# Patient Record
Sex: MALE | Race: BLACK OR AFRICAN AMERICAN | NOT HISPANIC OR LATINO | URBAN - METROPOLITAN AREA
[De-identification: names, ages, dates, MRNs, and addresses within clinical notes are randomized per-mention and may not be internally consistent; named-entity substitution may affect disease eponyms.]

---

## 2023-10-12 ENCOUNTER — INPATIENT (INPATIENT)
Facility: HOSPITAL | Age: 48
LOS: 4 days | Discharge: HOME | DRG: 322 | End: 2023-10-17
Attending: INTERNAL MEDICINE | Admitting: STUDENT IN AN ORGANIZED HEALTH CARE EDUCATION/TRAINING PROGRAM
Payer: COMMERCIAL

## 2023-10-12 VITALS
OXYGEN SATURATION: 94 % | TEMPERATURE: 98 F | SYSTOLIC BLOOD PRESSURE: 117 MMHG | WEIGHT: 279.99 LBS | DIASTOLIC BLOOD PRESSURE: 88 MMHG | HEART RATE: 102 BPM | HEIGHT: 74 IN | RESPIRATION RATE: 20 BRPM

## 2023-10-12 DIAGNOSIS — I10 ESSENTIAL (PRIMARY) HYPERTENSION: ICD-10-CM

## 2023-10-12 DIAGNOSIS — Z79.84 LONG TERM (CURRENT) USE OF ORAL HYPOGLYCEMIC DRUGS: ICD-10-CM

## 2023-10-12 DIAGNOSIS — I24.9 ACUTE ISCHEMIC HEART DISEASE, UNSPECIFIED: ICD-10-CM

## 2023-10-12 DIAGNOSIS — I25.10 ATHEROSCLEROTIC HEART DISEASE OF NATIVE CORONARY ARTERY WITHOUT ANGINA PECTORIS: ICD-10-CM

## 2023-10-12 DIAGNOSIS — R07.9 CHEST PAIN, UNSPECIFIED: ICD-10-CM

## 2023-10-12 DIAGNOSIS — I25.84 CORONARY ATHEROSCLEROSIS DUE TO CALCIFIED CORONARY LESION: ICD-10-CM

## 2023-10-12 DIAGNOSIS — E78.5 HYPERLIPIDEMIA, UNSPECIFIED: ICD-10-CM

## 2023-10-12 DIAGNOSIS — R55 SYNCOPE AND COLLAPSE: ICD-10-CM

## 2023-10-12 DIAGNOSIS — E11.9 TYPE 2 DIABETES MELLITUS WITHOUT COMPLICATIONS: ICD-10-CM

## 2023-10-12 DIAGNOSIS — E83.42 HYPOMAGNESEMIA: ICD-10-CM

## 2023-10-12 DIAGNOSIS — I25.119 ATHEROSCLEROTIC HEART DISEASE OF NATIVE CORONARY ARTERY WITH UNSPECIFIED ANGINA PECTORIS: ICD-10-CM

## 2023-10-12 DIAGNOSIS — F12.91 CANNABIS USE, UNSPECIFIED, IN REMISSION: ICD-10-CM

## 2023-10-12 LAB
ANION GAP SERPL CALC-SCNC: 13 MMOL/L — SIGNIFICANT CHANGE UP (ref 5–17)
APTT BLD: 35.4 SEC — SIGNIFICANT CHANGE UP (ref 24.5–35.6)
BASOPHILS # BLD AUTO: 0.1 K/UL — SIGNIFICANT CHANGE UP (ref 0–0.2)
BASOPHILS NFR BLD AUTO: 1.8 % — SIGNIFICANT CHANGE UP (ref 0–2)
BUN SERPL-MCNC: 12 MG/DL — SIGNIFICANT CHANGE UP (ref 7–23)
CALCIUM SERPL-MCNC: 10 MG/DL — SIGNIFICANT CHANGE UP (ref 8.4–10.5)
CHLORIDE SERPL-SCNC: 94 MMOL/L — LOW (ref 96–108)
CO2 SERPL-SCNC: 29 MMOL/L — SIGNIFICANT CHANGE UP (ref 22–31)
CREAT SERPL-MCNC: 1.12 MG/DL — SIGNIFICANT CHANGE UP (ref 0.5–1.3)
EGFR: 81 ML/MIN/1.73M2 — SIGNIFICANT CHANGE UP
EOSINOPHIL # BLD AUTO: 0.16 K/UL — SIGNIFICANT CHANGE UP (ref 0–0.5)
EOSINOPHIL NFR BLD AUTO: 2.9 % — SIGNIFICANT CHANGE UP (ref 0–6)
GLUCOSE BLDC GLUCOMTR-MCNC: 134 MG/DL — HIGH (ref 70–99)
GLUCOSE SERPL-MCNC: 141 MG/DL — HIGH (ref 70–99)
HCT VFR BLD CALC: 45 % — SIGNIFICANT CHANGE UP (ref 39–50)
HGB BLD-MCNC: 15.5 G/DL — SIGNIFICANT CHANGE UP (ref 13–17)
IMM GRANULOCYTES NFR BLD AUTO: 0.4 % — SIGNIFICANT CHANGE UP (ref 0–0.9)
INR BLD: 1.28 — HIGH (ref 0.85–1.18)
LYMPHOCYTES # BLD AUTO: 2.26 K/UL — SIGNIFICANT CHANGE UP (ref 1–3.3)
LYMPHOCYTES # BLD AUTO: 40.6 % — SIGNIFICANT CHANGE UP (ref 13–44)
MCHC RBC-ENTMCNC: 29.3 PG — SIGNIFICANT CHANGE UP (ref 27–34)
MCHC RBC-ENTMCNC: 34.4 GM/DL — SIGNIFICANT CHANGE UP (ref 32–36)
MCV RBC AUTO: 85.1 FL — SIGNIFICANT CHANGE UP (ref 80–100)
MONOCYTES # BLD AUTO: 0.41 K/UL — SIGNIFICANT CHANGE UP (ref 0–0.9)
MONOCYTES NFR BLD AUTO: 7.4 % — SIGNIFICANT CHANGE UP (ref 2–14)
NEUTROPHILS # BLD AUTO: 2.62 K/UL — SIGNIFICANT CHANGE UP (ref 1.8–7.4)
NEUTROPHILS NFR BLD AUTO: 46.9 % — SIGNIFICANT CHANGE UP (ref 43–77)
NRBC # BLD: 0 /100 WBCS — SIGNIFICANT CHANGE UP (ref 0–0)
PLATELET # BLD AUTO: 275 K/UL — SIGNIFICANT CHANGE UP (ref 150–400)
POTASSIUM SERPL-MCNC: 3.4 MMOL/L — LOW (ref 3.5–5.3)
POTASSIUM SERPL-SCNC: 3.4 MMOL/L — LOW (ref 3.5–5.3)
PROTHROM AB SERPL-ACNC: 14.5 SEC — HIGH (ref 9.5–13)
RBC # BLD: 5.29 M/UL — SIGNIFICANT CHANGE UP (ref 4.2–5.8)
RBC # FLD: 13.2 % — SIGNIFICANT CHANGE UP (ref 10.3–14.5)
SODIUM SERPL-SCNC: 136 MMOL/L — SIGNIFICANT CHANGE UP (ref 135–145)
TROPONIN T, HIGH SENSITIVITY RESULT: 22 NG/L — SIGNIFICANT CHANGE UP (ref 0–51)
WBC # BLD: 5.57 K/UL — SIGNIFICANT CHANGE UP (ref 3.8–10.5)
WBC # FLD AUTO: 5.57 K/UL — SIGNIFICANT CHANGE UP (ref 3.8–10.5)

## 2023-10-12 PROCEDURE — 71046 X-RAY EXAM CHEST 2 VIEWS: CPT | Mod: 26

## 2023-10-12 PROCEDURE — 99221 1ST HOSP IP/OBS SF/LOW 40: CPT

## 2023-10-12 PROCEDURE — 99285 EMERGENCY DEPT VISIT HI MDM: CPT

## 2023-10-12 RX ORDER — ASPIRIN/CALCIUM CARB/MAGNESIUM 324 MG
325 TABLET ORAL ONCE
Refills: 0 | Status: COMPLETED | OUTPATIENT
Start: 2023-10-12 | End: 2023-10-12

## 2023-10-12 RX ORDER — MAGNESIUM OXIDE 400 MG ORAL TABLET 241.3 MG
800 TABLET ORAL ONCE
Refills: 0 | Status: COMPLETED | OUTPATIENT
Start: 2023-10-12 | End: 2023-10-12

## 2023-10-12 RX ORDER — POTASSIUM CHLORIDE 20 MEQ
40 PACKET (EA) ORAL ONCE
Refills: 0 | Status: COMPLETED | OUTPATIENT
Start: 2023-10-12 | End: 2023-10-12

## 2023-10-12 RX ORDER — ATORVASTATIN CALCIUM 80 MG/1
40 TABLET, FILM COATED ORAL AT BEDTIME
Refills: 0 | Status: DISCONTINUED | OUTPATIENT
Start: 2023-10-12 | End: 2023-10-16

## 2023-10-12 RX ADMIN — MAGNESIUM OXIDE 400 MG ORAL TABLET 800 MILLIGRAM(S): 241.3 TABLET ORAL at 23:50

## 2023-10-12 RX ADMIN — Medication 325 MILLIGRAM(S): at 18:36

## 2023-10-12 RX ADMIN — ATORVASTATIN CALCIUM 40 MILLIGRAM(S): 80 TABLET, FILM COATED ORAL at 23:51

## 2023-10-12 RX ADMIN — Medication 40 MILLIEQUIVALENT(S): at 19:32

## 2023-10-12 NOTE — H&P ADULT - NSICDXFAMILYHX_GEN_ALL_CORE_FT
FAMILY HISTORY:  Mother  Still living? Unknown  FHx: heart disease, Age at diagnosis: Age Unknown

## 2023-10-12 NOTE — H&P ADULT - PROBLEM SELECTOR PLAN 5
Follow-up Hemoglobin A1C   -Moderate Dose sliding scale      FULL CODE  DVT Prophylaxis: Heparin Sub Q  Dispo: Pending clinical progression

## 2023-10-12 NOTE — ED PROVIDER NOTE - CLINICAL SUMMARY MEDICAL DECISION MAKING FREE TEXT BOX
48M PMH HTN, HLD, p/w 1-2w of intermittent chest heaviness - associated w/ lightheaded/palpitations/nausea/diaphoresis/SOB. Symptoms are only w/ exertion, resolve when he rests. Currently asymptomatic. No other systemic symptoms. States he went to outside ER ~1w ago and was dc'd from ED w/ PMD f/u - had followed up w/ PMD and was referred to cards but symptoms worsening so came to ED today.   Mild triage tachycardia self resolved, other vitals wnl. Exam as above.  ddx: Concern for anginal symptoms.   Labs, CXR, asa, likely admission for further w/u.

## 2023-10-12 NOTE — H&P ADULT - PROBLEM SELECTOR PLAN 1
Patient presented to ER w/   - EKG 10/12/23: NSR w/ PACs, non specific ICD no acute ischemic changes   - CXR 10/12/23: no acute pathology   - S/p ASA 325mg POx1   - NPO at MN for ischemic work up CCTA vs cath   - F/u TTE in AM -Patient reports exertional C/P with SOB, lightheadedness, diaphoresis, palpitations, nausea   -Troponin T Hs 25->22 (negative) .  EKG non-ischemic. Follow-up repeat EKG in AM.    -NPO for possible CCTA (Tachycardia may preclude this test) vs NST  -Echocardiogram ordered. Follow-up results.   -Continue telemetry   -s/p  mg PO x 1 in ER. Continue ASA 81 mg Daily

## 2023-10-12 NOTE — H&P ADULT - NSHPLABSRESULTS_GEN_ALL_CORE
15.5   5.57  )-----------( 275      ( 12 Oct 2023 16:52 )             45.0       10-12    136  |  94<L>  |  12  ----------------------------<  141<H>  3.4<L>   |  29  |  1.12    Ca    10.0      12 Oct 2023 16:52  Mg     1.7     10-12        PT/INR - ( 12 Oct 2023 18:38 )   PT: 14.5 sec;   INR: 1.28          PTT - ( 12 Oct 2023 18:38 )  PTT:35.4 sec          Urinalysis Basic - ( 12 Oct 2023 16:52 )    Color: x / Appearance: x / SG: x / pH: x  Gluc: 141 mg/dL / Ketone: x  / Bili: x / Urobili: x   Blood: x / Protein: x / Nitrite: x   Leuk Esterase: x / RBC: x / WBC x   Sq Epi: x / Non Sq Epi: x / Bacteria: x        EKG: 15.5   5.57  )-----------( 275      ( 12 Oct 2023 16:52 )             45.0       10-12    136  |  94<L>  |  12  ----------------------------<  141<H>  3.4<L>   |  29  |  1.12    Ca    10.0      12 Oct 2023 16:52  Mg     1.7     10-12      PT/INR - ( 12 Oct 2023 18:38 )   PT: 14.5 sec;   INR: 1.28     PTT - ( 12 Oct 2023 18:38 )  PTT:35.4 sec  CARDIAC MARKERS ( 12 Oct 2023 21:20 )  x     / x     / 295 U/L / x     / 5.2 ng/mL    EKG: NSR at 89 bpm with PACs, no ST changes and TWI III.

## 2023-10-12 NOTE — H&P ADULT - PROBLEM SELECTOR PLAN 2
SBP   -Home meds: Etiology possibly vasovagal in nature given symptomatology   -Orthostatics. UTox   -Telemetry to r/o arrhythmias   -TFTs   -Consider EP consult for possible event monitor/loop recorder     R/O PE/DVT: Add On D-dimer: 648. Cannot use age adjusted d-dimer as patient <50. Tachycardia and patient with limited mobility given back surgery and stays home often due to fear of syncope outside. No tachypnea,No hypoxia, No LE edema or calf pain. LE Venous Duplex ordered can consider CTA R/O PE if clinically indicated. Etiology possibly vasovagal in nature given symptomatology   -Orthostatics. UTox   -Telemetry to r/o arrhythmias   -TFTs   -Consider EP consult for possible event monitor/loop recorder     R/O PE/DVT: Add On D-dimer: 648. Cannot use age adjusted d-dimer as patient <50. Tachycardia and patient with limited mobility given back surgery and stays home often due to fear of syncope outside. No tachypnea, No hypoxia, No LE edema or calf pain. Troponing negative. LE Venous Duplex ordered can consider CTA R/O PE if clinically indicated.

## 2023-10-12 NOTE — PATIENT PROFILE ADULT - FALL HARM RISK - HARM RISK INTERVENTIONS
Assistance OOB with selected safe patient handling equipment/Communicate Risk of Fall with Harm to all staff/Monitor for mental status changes/Monitor gait and stability/Reinforce activity limits and safety measures with patient and family/Tailored Fall Risk Interventions/Use of alarms - bed, chair and/or voice tab/Visual Cue: Yellow wristband and red socks/Bed in lowest position, wheels locked, appropriate side rails in place/Call bell, personal items and telephone in reach/Instruct patient to call for assistance before getting out of bed or chair/Non-slip footwear when patient is out of bed/[place] to call system/Physically safe environment - no spills, clutter or unnecessary equipment/Purposeful Proactive Rounding/Room/bathroom lighting operational, light cord in reach

## 2023-10-12 NOTE — H&P ADULT - ASSESSMENT
48M current smoker PMH HTN, HLD presented to [facility] ED on 10/12 with complaints of intermittent episodes of chest heaviness a/w lightheadness, palpitations, nausea, diaphoresis, SOB which are present w/ exertion and relieved with rest. Trop 25 -> 22 EKG NSR w/ PACS and non specific ICD no acute ischemic changes, CXR clear. Patient loaded w/ ASA 325mg POx1 in ER and now admitted to cardiac tele for ACS rule out/ischemic work up.      48 M former marijuana user (last used 1 month ago) FAIR HISTORIAN with FHx Heart Disease (Mother) and PMH HTN, HLD, DM Type II, ? Hepatitis A, Chronic Back pain s/p Fall s/p Back Surgery 8/2019, GIB (underwent endoscopy and colonoscopy 2 months ago with no source of bleed seen, polyp removed with recommendation to come back in 1 year)  who presented to [facility] ED 10/12/13 c/o intermittent episodes of chest heaviness a/w lightheadedness, palpitations, nausea, diaphoresis, SOB. Troponin negative and EKG nonischemic admitted for R/O ACS

## 2023-10-12 NOTE — ED ADULT NURSE REASSESSMENT NOTE - NS ED NURSE REASSESS COMMENT FT1
Received report from day shift RN/ Pt resting comfortably in chair. Resp even and unlabored. Pending admission. V/s updated

## 2023-10-12 NOTE — H&P ADULT - PROBLEM SELECTOR PLAN 4
, HDL 31, Total Cholesterol 163, Triglycerides 63.   -Follow-up Fasting Lipid Panel in AM   -Continue Atorvastatin 40 mg PO QHS

## 2023-10-12 NOTE — ED PROVIDER NOTE - OBJECTIVE STATEMENT
48M PMH HTN, HLD, p/w 1-2w of intermittent chest heaviness - associated w/ lightheaded/palpitations/nausea/diaphoresis/SOB. Symptoms are only w/ exertion, resolve when he rests. Currently asymptomatic. No other systemic symptoms. States he went to outside ER ~1w ago and was dc'd from ED w/ PMD f/u - had followed up w/ PMD and was referred to cards but symptoms worsening so came to ED today.   Denies associated vomiting, diarrhea, palpitations, cough, rhinorrhea, black stool, bloody stool, LE pain, LE swelling, focal weakness/numbness, recent travel/immobilization, abd pain, urinary complaints, f/c. No hormone use.  No known prior cardiac w/u.

## 2023-10-12 NOTE — H&P ADULT - HISTORY OF PRESENT ILLNESS
48M current smoker [facility] HTN, HLD presented to [facility] ED on 10/12 with complaints of intermittent episodes of chest heaviness a/w lightheadness, palpitations, nausea, diaphoresis, SOB which are present w/ exertion and relieved with rest. At this time patient denies CP, BONE, SOB, palpitations, cough, abdominal pain, N/V/D, melena, BRBPR, LE swelling, fever, chills, or sick contacts.    Patient states he presented to ER 1 week for similar complaints and was sent home and told to follow up with PMD and cardiologist. Symptoms worsened which prompted patient to return to ER.     ER Course   WBC  5.57 H/H 15.5/45.0 Plt 275   PTT 14.5 INR 1.28  PT35.4   Trop T 25 -> 22   Na 136 K 3.4 Cl 94 BUN/Cr 12/1.12 Glucose 141 Mag 1.7   Chol 163 Tri 79 HDL 31    EKG 10/12/23: 89 BPM PACs non-specific intraventricular conduction delay no acute ischemic changes   CXR 10/12/23: no cardiomegaly, lungs clear     ED Interventions: ASA 325mg POx1 and KCl 40mmEq POx1   Admit to cardiac tele for ACS rule out.    48 M former marijuana user (last used 1 month ago) FAIR HISTORIAN with FHx Heart Disease (Mother) and PMH HTN, HLD, DM Type II, ? Hepatitis A, Chronic Back pain s/p Fall s/p Back Surgery 8/2019, GIB (underwent endoscopy and colonoscopy 2 months ago with no source of bleed seen, polyp removed with recommendation to come back in 1 year) who presents to [facility] ED 10/12/13 c/o intermittent episodes of chest heaviness a/w lightheadedness, palpitations, nausea, diaphoresis, SOB which are present w/ exertion and relieved with rest. Patient reports he has been experiencing these symptoms since Summer 2023 however feels they have gotten worse. He also admits to syncope x 4 episodes at home following these symptoms but denies paresthesias of upper or lower extremities, weakness/paralysis of upper or lower extremities, seizure activity, loss of cordelia or bladder incontinence, H/A prior to or after syncopal episodes. He also denies checking Fingerstick before or after syncopal episodes. Patient states he presented to [facility] ER 1 week for similar complaints and was sent home and told to follow up with PMD and cardiologist. Symptoms worsened which prompted patient to return to ER. He denies LE edema, PND, orthopnea, cough, fever, chills, diarrhea, abdominal pain. VS on arrival to ER /88  RR 20 Temp 97.9 F and O2 sat 94% RA. Troponin T Hs 25->22, .  EKG showed NSR at 89 bpm with PACs, no ST changes and TWI III. CXR (wet read) no effusions or infiltrates. Labs significant for K 3.4, Cl 94, Glucose 141. Treatment in the ER:  mg PO x 1 and Kdur 40 mEq PO x 1. Patient is now admitted to R/O ACS including serial enzymes, telemetry, echocardiogram and probable ischemic evaluation.     Medication list obtained from [place] and confirmed with patient at bedside    48 M former marijuana user (last used 1 month ago) FAIR HISTORIAN with FHx Heart Disease (Mother) and PMH HTN, HLD, DM Type II, ? Hepatitis A, Chronic Back pain s/p Fall s/p Back Surgery 8/2019, GIB (underwent endoscopy and colonoscopy 2 months ago with no source of bleed seen, polyp removed with recommendation to come back in 1 year) who presents to [facility] ED 10/12/13 c/o intermittent episodes of chest heaviness a/w lightheadedness, palpitations, nausea, diaphoresis, SOB which are present w/ exertion and relieved with rest. Patient reports he has been experiencing these symptoms since Summer 2023 however feels they have gotten worse. He also admits to syncope x 4 episodes at home following these symptoms but denies paresthesias of upper or lower extremities, weakness/paralysis of upper or lower extremities, seizure activity, loss of cordelia or bladder incontinence, H/A prior to or after syncopal episodes. He also denies checking Fingerstick before or after syncopal episodes. Patient states he presented to [facility] ER 1 week for similar complaints and was sent home and told to follow up with PMD and cardiologist. Symptoms worsened which prompted patient to return to ER. He denies LE edema, PND, orthopnea, cough, fever, chills, diarrhea, abdominal pain. VS on arrival to ER /88  RR 20 Temp 97.9 F and O2 sat 94% RA. Troponin T Hs 25->22, .  EKG showed NSR at 89 bpm with PACs, no ST changes and TWI III. CXR (wet read) no effusions or infiltrates. Labs significant for K 3.4, Cl 94, Glucose 141. Treatment in the ER:  mg PO x 1 and Kdur 40 mEq PO x 1. Patient is now admitted to R/O ACS including serial enzymes, telemetry, echocardiogram and probable ischemic evaluation.

## 2023-10-12 NOTE — H&P ADULT - NS ATTEND AMEND GEN_ALL_CORE FT
I saw, evaluated, and examined the patient at the bedside. I discussed the patient’s case with the ACP and agree with ACP’s note, ROS findings, physical exam, assessment, and plan as documented in the ACP’s note, with the following addendum.     47 yo man PMHx of cannabis use disorder, HTN, DM2, and HLD who was admitted for cardiac chest pain and dyspnea on exertion x few months.    Assessment  1) Cardiac chest pain - ACS ruled out w/ negative hsTrop   2) Dyspnea on exertion  3) HTN  4) DM2  5) HLD  6) Elevated d-dimer    Plan  1) Will obtain TTE and CCTA  2) Given cannabis use, tachycardia, dyspnea, and elevated d-dimer, will obtain CT angio PE protocol to r/o PE. No clinical suspicion for DVT.  3) ASA 81mg daily and lipitor 40mg qhs  4) Continue mtoprolol and lisinopril at home dose for HTN    I spent 40 minutes in total time. During non face-to-face time, I reviewed relevant portions of the patient’s medical record. During face-to-face time, I took a relevant history and examined the patient. I also explained differential diagnoses, relevant cardiac diagnoses, workup, and management plan. I answered all questions related to the patient's medical conditions.     Álvaro Horton M.D.  CARDIOLOGY ATTENDING

## 2023-10-12 NOTE — ED ADULT NURSE NOTE - CHIEF COMPLAINT QUOTE
c/o palpitations, dizziness, diaphoresis with activity and relieved with rest x 2 months. hx of HTN, DM. denies CP, SOB, blurry vision. ekg in progress.

## 2023-10-12 NOTE — PATIENT PROFILE ADULT - DO YOU FEEL UNSAFE AT HOME, WORK, OR SCHOOL?
Cam Perez is a 45 y.o. male  Hyperlipidemia (F/U on labs)      History of Present Illness  Patient is a pleasant 45-year-old black male who comes in complaining of hyperlipidemia, HDL was 39 LDL was 172, A1c was 6.0, patient states that he has started to watch his diet and try and exercise does have a family history of diabetes.  Patient states does not follow a close diet  The following portions of the patient's history were reviewed and updated as appropriate: allergies, current medications, past social history and problem list    Review of Systems   Constitutional: Negative for appetite change, diaphoresis, fatigue and unexpected weight change.   Eyes: Negative for visual disturbance.   Respiratory: Negative for cough, chest tightness and shortness of breath.    Cardiovascular: Negative for chest pain, palpitations and leg swelling.   Gastrointestinal: Negative for diarrhea, nausea and vomiting.   Endocrine: Negative for polydipsia, polyphagia and polyuria.   Skin: Negative for color change and rash.   Neurological: Negative for dizziness, syncope, weakness, light-headedness, numbness and headaches.       Objective     Vitals:    12/02/22 1058   BP: 134/80   Pulse: 56   Resp: 14   Temp: 97 °F (36.1 °C)   SpO2: 99%       Physical Exam  Vitals and nursing note reviewed.   Constitutional:       General: He is not in acute distress.     Appearance: Normal appearance. He is well-developed. He is not ill-appearing, toxic-appearing or diaphoretic.   Neck:      Vascular: No carotid bruit or JVD.   Cardiovascular:      Rate and Rhythm: Normal rate and regular rhythm.      Pulses: Normal pulses.      Heart sounds: Normal heart sounds. No murmur heard.  Pulmonary:      Effort: Pulmonary effort is normal. No respiratory distress.      Breath sounds: Normal breath sounds.   Abdominal:      Palpations: Abdomen is soft.      Tenderness: There is no abdominal tenderness.   Skin:     General: Skin is warm and  dry.   Neurological:      Mental Status: He is alert.         Assessment & Plan     Diagnoses and all orders for this visit:    1. Hyperlipidemia, unspecified hyperlipidemia type (Primary)  -     Lipid Panel; Future  -     Hepatic Function Panel; Future    2. Screen for colon cancer  -     Cologuard - Stool, Per Rectum; Future  -     atorvastatin (Lipitor) 20 MG tablet; Take 1 tablet by mouth Daily.  Dispense: 90 tablet; Refill: 2    Recheck labs in 6 weeks    I spent 15 minutes in patient care: Reviewing records prior to the visit, examining the patient, entering orders and documentation    Part of this note may be an electronic transcription/translation of spoken language to printed text using the Dragon Dictation System.      no

## 2023-10-12 NOTE — ED ADULT NURSE NOTE - OBJECTIVE STATEMENT
49 y/o M with pmhx HTN and DM presents to the ED c/o intermittent episodes of palpitations, diaphoresis, and dizziness x2 months. States he was last seen for sx Friday and didn't get the answers so he came back today. Denies recent changes to his BP medications and states he did not take his insulin today because he did not eat. Denies having sx present at this time. Denies CP, SOB, edema, N/V/D, and any other complaints at this time. On exam, A&Ox4, NAD, ambulatory on RA. VSS. Orthostatics negative, see flowsheet. Skin dry. No edema. Pt placed on the cardiac monitor. PIV placed. Labs sent.

## 2023-10-12 NOTE — ED PROVIDER NOTE - PROGRESS NOTE DETAILS
[place]: CXR wnl. INR 1.28, K 3.4 (repleted), trop 25, Cl 94, other labs grossly wnl. Remains asymptomatic in ED. Will admit cards for further care. Updated pt.

## 2023-10-13 ENCOUNTER — TRANSCRIPTION ENCOUNTER (OUTPATIENT)
Age: 48
End: 2023-10-13

## 2023-10-13 DIAGNOSIS — R07.9 CHEST PAIN, UNSPECIFIED: ICD-10-CM

## 2023-10-13 DIAGNOSIS — E11.9 TYPE 2 DIABETES MELLITUS WITHOUT COMPLICATIONS: ICD-10-CM

## 2023-10-13 DIAGNOSIS — R55 SYNCOPE AND COLLAPSE: ICD-10-CM

## 2023-10-13 DIAGNOSIS — Z98.890 OTHER SPECIFIED POSTPROCEDURAL STATES: Chronic | ICD-10-CM

## 2023-10-13 LAB
A1C WITH ESTIMATED AVERAGE GLUCOSE RESULT: 6.4 % — HIGH (ref 4–5.6)
ALBUMIN SERPL ELPH-MCNC: 3.8 G/DL — SIGNIFICANT CHANGE UP (ref 3.3–5)
ALP SERPL-CCNC: 90 U/L — SIGNIFICANT CHANGE UP (ref 40–120)
ALT FLD-CCNC: 30 U/L — SIGNIFICANT CHANGE UP (ref 10–45)
ANION GAP SERPL CALC-SCNC: 10 MMOL/L — SIGNIFICANT CHANGE UP (ref 5–17)
APPEARANCE UR: CLEAR — SIGNIFICANT CHANGE UP
AST SERPL-CCNC: 19 U/L — SIGNIFICANT CHANGE UP (ref 10–40)
BASOPHILS # BLD AUTO: 0.1 K/UL — SIGNIFICANT CHANGE UP (ref 0–0.2)
BASOPHILS NFR BLD AUTO: 1.9 % — SIGNIFICANT CHANGE UP (ref 0–2)
BILIRUB SERPL-MCNC: 0.4 MG/DL — SIGNIFICANT CHANGE UP (ref 0.2–1.2)
BILIRUB UR-MCNC: ABNORMAL
BUN SERPL-MCNC: 18 MG/DL — SIGNIFICANT CHANGE UP (ref 7–23)
CALCIUM SERPL-MCNC: 9.6 MG/DL — SIGNIFICANT CHANGE UP (ref 8.4–10.5)
CHLORIDE SERPL-SCNC: 100 MMOL/L — SIGNIFICANT CHANGE UP (ref 96–108)
CHOLEST SERPL-MCNC: 152 MG/DL — SIGNIFICANT CHANGE UP
CK MB CFR SERPL CALC: 5.2 NG/ML — SIGNIFICANT CHANGE UP (ref 0–6.7)
CK SERPL-CCNC: 295 U/L — HIGH (ref 30–200)
CO2 SERPL-SCNC: 30 MMOL/L — SIGNIFICANT CHANGE UP (ref 22–31)
COLOR SPEC: YELLOW — SIGNIFICANT CHANGE UP
CREAT ?TM UR-MCNC: 424 MG/DL — SIGNIFICANT CHANGE UP
CREAT SERPL-MCNC: 1.3 MG/DL — SIGNIFICANT CHANGE UP (ref 0.5–1.3)
D DIMER BLD IA.RAPID-MCNC: 648 NG/ML DDU — HIGH
DIFF PNL FLD: NEGATIVE — SIGNIFICANT CHANGE UP
EGFR: 68 ML/MIN/1.73M2 — SIGNIFICANT CHANGE UP
EOSINOPHIL # BLD AUTO: 0.19 K/UL — SIGNIFICANT CHANGE UP (ref 0–0.5)
EOSINOPHIL NFR BLD AUTO: 3.6 % — SIGNIFICANT CHANGE UP (ref 0–6)
ESTIMATED AVERAGE GLUCOSE: 137 MG/DL — HIGH (ref 68–114)
GLUCOSE BLDC GLUCOMTR-MCNC: 102 MG/DL — HIGH (ref 70–99)
GLUCOSE BLDC GLUCOMTR-MCNC: 111 MG/DL — HIGH (ref 70–99)
GLUCOSE BLDC GLUCOMTR-MCNC: 123 MG/DL — HIGH (ref 70–99)
GLUCOSE BLDC GLUCOMTR-MCNC: 95 MG/DL — SIGNIFICANT CHANGE UP (ref 70–99)
GLUCOSE SERPL-MCNC: 106 MG/DL — HIGH (ref 70–99)
GLUCOSE UR QL: NEGATIVE — SIGNIFICANT CHANGE UP
HCT VFR BLD CALC: 43.1 % — SIGNIFICANT CHANGE UP (ref 39–50)
HDLC SERPL-MCNC: 28 MG/DL — LOW
HGB BLD-MCNC: 14.1 G/DL — SIGNIFICANT CHANGE UP (ref 13–17)
IMM GRANULOCYTES NFR BLD AUTO: 0.2 % — SIGNIFICANT CHANGE UP (ref 0–0.9)
KETONES UR-MCNC: 15 MG/DL
LEUKOCYTE ESTERASE UR-ACNC: NEGATIVE — SIGNIFICANT CHANGE UP
LIPID PNL WITH DIRECT LDL SERPL: 107 MG/DL — HIGH
LYMPHOCYTES # BLD AUTO: 2.52 K/UL — SIGNIFICANT CHANGE UP (ref 1–3.3)
LYMPHOCYTES # BLD AUTO: 48.3 % — HIGH (ref 13–44)
MAGNESIUM SERPL-MCNC: 1.8 MG/DL — SIGNIFICANT CHANGE UP (ref 1.6–2.6)
MCHC RBC-ENTMCNC: 28.5 PG — SIGNIFICANT CHANGE UP (ref 27–34)
MCHC RBC-ENTMCNC: 32.7 GM/DL — SIGNIFICANT CHANGE UP (ref 32–36)
MCV RBC AUTO: 87.1 FL — SIGNIFICANT CHANGE UP (ref 80–100)
MONOCYTES # BLD AUTO: 0.46 K/UL — SIGNIFICANT CHANGE UP (ref 0–0.9)
MONOCYTES NFR BLD AUTO: 8.8 % — SIGNIFICANT CHANGE UP (ref 2–14)
NEUTROPHILS # BLD AUTO: 1.94 K/UL — SIGNIFICANT CHANGE UP (ref 1.8–7.4)
NEUTROPHILS NFR BLD AUTO: 37.2 % — LOW (ref 43–77)
NITRITE UR-MCNC: NEGATIVE — SIGNIFICANT CHANGE UP
NON HDL CHOLESTEROL: 124 MG/DL — SIGNIFICANT CHANGE UP
NRBC # BLD: 0 /100 WBCS — SIGNIFICANT CHANGE UP (ref 0–0)
OSMOLALITY UR: 1048 MOSM/KG — HIGH (ref 300–900)
PH UR: 5.5 — SIGNIFICANT CHANGE UP (ref 5–8)
PLATELET # BLD AUTO: 263 K/UL — SIGNIFICANT CHANGE UP (ref 150–400)
POTASSIUM SERPL-MCNC: 3.6 MMOL/L — SIGNIFICANT CHANGE UP (ref 3.5–5.3)
POTASSIUM SERPL-SCNC: 3.6 MMOL/L — SIGNIFICANT CHANGE UP (ref 3.5–5.3)
POTASSIUM UR-SCNC: 61 MMOL/L — SIGNIFICANT CHANGE UP
PROT ?TM UR-MCNC: 16 MG/DL — HIGH (ref 0–12)
PROT SERPL-MCNC: 7.5 G/DL — SIGNIFICANT CHANGE UP (ref 6–8.3)
PROT UR-MCNC: NEGATIVE MG/DL — SIGNIFICANT CHANGE UP
PROT/CREAT UR-RTO: 0 RATIO — SIGNIFICANT CHANGE UP (ref 0–0.2)
RBC # BLD: 4.95 M/UL — SIGNIFICANT CHANGE UP (ref 4.2–5.8)
RBC # FLD: 13.6 % — SIGNIFICANT CHANGE UP (ref 10.3–14.5)
SODIUM SERPL-SCNC: 140 MMOL/L — SIGNIFICANT CHANGE UP (ref 135–145)
SODIUM UR-SCNC: 162 MMOL/L — SIGNIFICANT CHANGE UP
SP GR SPEC: >=1.03 — SIGNIFICANT CHANGE UP (ref 1–1.03)
T4 FREE SERPL-MCNC: 1.26 NG/DL — SIGNIFICANT CHANGE UP (ref 0.93–1.7)
TRIGL SERPL-MCNC: 86 MG/DL — SIGNIFICANT CHANGE UP
TSH SERPL-MCNC: 2.49 UIU/ML — SIGNIFICANT CHANGE UP (ref 0.27–4.2)
UROBILINOGEN FLD QL: 1 E.U./DL — SIGNIFICANT CHANGE UP
UUN UR-MCNC: 1474 MG/DL — SIGNIFICANT CHANGE UP
WBC # BLD: 5.22 K/UL — SIGNIFICANT CHANGE UP (ref 3.8–10.5)
WBC # FLD AUTO: 5.22 K/UL — SIGNIFICANT CHANGE UP (ref 3.8–10.5)

## 2023-10-13 PROCEDURE — 71275 CT ANGIOGRAPHY CHEST: CPT | Mod: 26

## 2023-10-13 PROCEDURE — 99232 SBSQ HOSP IP/OBS MODERATE 35: CPT

## 2023-10-13 PROCEDURE — 75574 CT ANGIO HRT W/3D IMAGE: CPT | Mod: 26

## 2023-10-13 PROCEDURE — 93306 TTE W/DOPPLER COMPLETE: CPT | Mod: 26

## 2023-10-13 RX ORDER — METOPROLOL TARTRATE 50 MG
25 TABLET ORAL ONCE
Refills: 0 | Status: DISCONTINUED | OUTPATIENT
Start: 2023-10-13 | End: 2023-10-13

## 2023-10-13 RX ORDER — INSULIN LISPRO 100/ML
VIAL (ML) SUBCUTANEOUS
Refills: 0 | Status: DISCONTINUED | OUTPATIENT
Start: 2023-10-13 | End: 2023-10-17

## 2023-10-13 RX ORDER — ASPIRIN/CALCIUM CARB/MAGNESIUM 324 MG
81 TABLET ORAL DAILY
Refills: 0 | Status: DISCONTINUED | OUTPATIENT
Start: 2023-10-13 | End: 2023-10-17

## 2023-10-13 RX ORDER — DEXTROSE 50 % IN WATER 50 %
25 SYRINGE (ML) INTRAVENOUS ONCE
Refills: 0 | Status: DISCONTINUED | OUTPATIENT
Start: 2023-10-13 | End: 2023-10-17

## 2023-10-13 RX ORDER — LISINOPRIL 2.5 MG/1
30 TABLET ORAL DAILY
Refills: 0 | Status: DISCONTINUED | OUTPATIENT
Start: 2023-10-13 | End: 2023-10-17

## 2023-10-13 RX ORDER — ALOGLIPTIN AND METFORMIN HYDROCHLORIDE 12.5; 5 MG/1; MG/1
1 TABLET, FILM COATED ORAL
Refills: 0 | DISCHARGE

## 2023-10-13 RX ORDER — SODIUM CHLORIDE 9 MG/ML
1000 INJECTION, SOLUTION INTRAVENOUS
Refills: 0 | Status: DISCONTINUED | OUTPATIENT
Start: 2023-10-13 | End: 2023-10-17

## 2023-10-13 RX ORDER — METOPROLOL TARTRATE 50 MG
25 TABLET ORAL ONCE
Refills: 0 | Status: COMPLETED | OUTPATIENT
Start: 2023-10-13 | End: 2023-10-13

## 2023-10-13 RX ORDER — DEXTROSE 50 % IN WATER 50 %
12.5 SYRINGE (ML) INTRAVENOUS ONCE
Refills: 0 | Status: DISCONTINUED | OUTPATIENT
Start: 2023-10-13 | End: 2023-10-17

## 2023-10-13 RX ORDER — BACLOFEN 100 %
1 POWDER (GRAM) MISCELLANEOUS
Refills: 0 | DISCHARGE

## 2023-10-13 RX ORDER — POTASSIUM CHLORIDE 20 MEQ
40 PACKET (EA) ORAL ONCE
Refills: 0 | Status: COMPLETED | OUTPATIENT
Start: 2023-10-13 | End: 2023-10-13

## 2023-10-13 RX ORDER — GLUCAGON INJECTION, SOLUTION 0.5 MG/.1ML
1 INJECTION, SOLUTION SUBCUTANEOUS ONCE
Refills: 0 | Status: DISCONTINUED | OUTPATIENT
Start: 2023-10-13 | End: 2023-10-17

## 2023-10-13 RX ORDER — LISINOPRIL 2.5 MG/1
1 TABLET ORAL
Refills: 0 | DISCHARGE

## 2023-10-13 RX ORDER — MAGNESIUM OXIDE 400 MG ORAL TABLET 241.3 MG
800 TABLET ORAL ONCE
Refills: 0 | Status: COMPLETED | OUTPATIENT
Start: 2023-10-13 | End: 2023-10-13

## 2023-10-13 RX ORDER — INFLUENZA VIRUS VACCINE 15; 15; 15; 15 UG/.5ML; UG/.5ML; UG/.5ML; UG/.5ML
0.5 SUSPENSION INTRAMUSCULAR ONCE
Refills: 0 | Status: DISCONTINUED | OUTPATIENT
Start: 2023-10-13 | End: 2023-10-17

## 2023-10-13 RX ORDER — BACLOFEN 100 %
20 POWDER (GRAM) MISCELLANEOUS EVERY 12 HOURS
Refills: 0 | Status: DISCONTINUED | OUTPATIENT
Start: 2023-10-13 | End: 2023-10-17

## 2023-10-13 RX ORDER — DEXTROSE 50 % IN WATER 50 %
15 SYRINGE (ML) INTRAVENOUS ONCE
Refills: 0 | Status: DISCONTINUED | OUTPATIENT
Start: 2023-10-13 | End: 2023-10-17

## 2023-10-13 RX ORDER — HEPARIN SODIUM 5000 [USP'U]/ML
5000 INJECTION INTRAVENOUS; SUBCUTANEOUS EVERY 8 HOURS
Refills: 0 | Status: DISCONTINUED | OUTPATIENT
Start: 2023-10-13 | End: 2023-10-17

## 2023-10-13 RX ADMIN — MAGNESIUM OXIDE 400 MG ORAL TABLET 800 MILLIGRAM(S): 241.3 TABLET ORAL at 17:55

## 2023-10-13 RX ADMIN — HEPARIN SODIUM 5000 UNIT(S): 5000 INJECTION INTRAVENOUS; SUBCUTANEOUS at 22:26

## 2023-10-13 RX ADMIN — LISINOPRIL 30 MILLIGRAM(S): 2.5 TABLET ORAL at 06:24

## 2023-10-13 RX ADMIN — Medication 81 MILLIGRAM(S): at 12:50

## 2023-10-13 RX ADMIN — Medication 25 MILLIGRAM(S): at 12:50

## 2023-10-13 RX ADMIN — HEPARIN SODIUM 5000 UNIT(S): 5000 INJECTION INTRAVENOUS; SUBCUTANEOUS at 06:25

## 2023-10-13 RX ADMIN — Medication 40 MILLIEQUIVALENT(S): at 17:55

## 2023-10-13 RX ADMIN — ATORVASTATIN CALCIUM 40 MILLIGRAM(S): 80 TABLET, FILM COATED ORAL at 22:26

## 2023-10-13 NOTE — DISCHARGE NOTE PROVIDER - CARE PROVIDERS DIRECT ADDRESSES
,DirectAddress_Unknown ,DirectAddress_Unknown,DirectAddress_Unknown ,DirectAddress_Unknown,DirectAddress_Unknown,[email or web address]

## 2023-10-13 NOTE — PROGRESS NOTE ADULT - PROBLEM SELECTOR PLAN 5
Follow-up Hemoglobin A1C   -Moderate Dose sliding scale      FULL CODE  DVT Prophylaxis: Heparin Sub Q  Dispo: Pending clinical progression Hgb A1c 6.4  -Moderate Dose sliding scale      FULL CODE  DVT Prophylaxis: Heparin Sub Q  Dispo: Pending clinical progression

## 2023-10-13 NOTE — PROGRESS NOTE ADULT - SUBJECTIVE AND OBJECTIVE BOX
S: Pt seen and examined bedside.  Patient denies C/P, SOB, N/V, dizziness, palpitations, and diaphoresis.  Pt denies fever/chills, dysuria, abdominal pain, diarrhea, and cough  12 Point ROS otherwise negative except as per HPI/subjective.     O: Vital Signs Last 24 Hrs  T(C): 36.4 (13 Oct 2023 12:49), Max: 37.1 (12 Oct 2023 22:05)  T(F): 97.6 (13 Oct 2023 12:49), Max: 98.7 (12 Oct 2023 22:05)  HR: 71 (13 Oct 2023 12:49) (68 - 102)  BP: 113/72 (13 Oct 2023 12:49) (110/78 - 137/82)  BP(mean): 90 (13 Oct 2023 06:02) (88 - 94)  RR: 18 (13 Oct 2023 12:49) (17 - 20)  SpO2: 97% (13 Oct 2023 12:49) (94% - 98%)    Parameters below as of 13 Oct 2023 12:49  Patient On (Oxygen Delivery Method): room air        PHYSICAL EXAM:  GEN: NAD  HEENT: No JVD  PULM:  CTA B/L  CARD:  RRR, S1 and S2   ABD: +BS, NT, soft/ND	  EXT: No Edema B/L LE  NEURO: A+Ox3, no focal deficit  PSYCH: Mood Appropriate    LABS:                        14.1   5.22  )-----------( 263      ( 13 Oct 2023 06:50 )             43.1     10-13    140  |  100  |  18  ----------------------------<  106<H>  3.6   |  30  |  1.30    Ca    9.6      13 Oct 2023 05:30  Mg     1.8     10-13    TPro  7.5  /  Alb  3.8  /  TBili  0.4  /  DBili  x   /  AST  19  /  ALT  30  /  AlkPhos  90  10-13    PT/INR - ( 12 Oct 2023 18:38 )   PT: 14.5 sec;   INR: 1.28          PTT - ( 12 Oct 2023 18:38 )  PTT:35.4 sec      10-12 @ 07:01  -  10-13 @ 07:00  --------------------------------------------------------  IN: 0 mL / OUT: 325 mL / NET: -325 mL    10-13 @ 07:01  -  10-13 @ 15:01  --------------------------------------------------------  IN: 0 mL / OUT: 200 mL / NET: -200 mL      Daily Height in cm: 187.96 (12 Oct 2023 23:00)    Daily    S: Pt seen and examined bedside. Pt reports feeling a little bit lightheaded on his way to the bathroom earlier, otherwise is feeling well.   Patient denies C/P, SOB, N/V, palpitations, and diaphoresis.  Pt denies fever/chills, dysuria, abdominal pain, diarrhea, and cough  12 Point ROS otherwise negative except as per HPI/subjective.     O: Vital Signs Last 24 Hrs  T(C): 36.4 (13 Oct 2023 12:49), Max: 37.1 (12 Oct 2023 22:05)  T(F): 97.6 (13 Oct 2023 12:49), Max: 98.7 (12 Oct 2023 22:05)  HR: 71 (13 Oct 2023 12:49) (68 - 102)  BP: 113/72 (13 Oct 2023 12:49) (110/78 - 137/82)  BP(mean): 90 (13 Oct 2023 06:02) (88 - 94)  RR: 18 (13 Oct 2023 12:49) (17 - 20)  SpO2: 97% (13 Oct 2023 12:49) (94% - 98%)    Parameters below as of 13 Oct 2023 12:49  Patient On (Oxygen Delivery Method): room air        PHYSICAL EXAM:  GEN: NAD  HEENT: No JVD  PULM:  CTA B/L, no WRR  CARD:  RRR, S1 and S2, no murmurs   ABD: +BS, NT, soft/ND	  EXT: No Edema B/L LE, distal pulses intact   NEURO: A+Ox3, no focal deficit  PSYCH: Mood Appropriate    LABS:                        14.1   5.22  )-----------( 263      ( 13 Oct 2023 06:50 )             43.1     10-13    140  |  100  |  18  ----------------------------<  106<H>  3.6   |  30  |  1.30    Ca    9.6      13 Oct 2023 05:30  Mg     1.8     10-13    TPro  7.5  /  Alb  3.8  /  TBili  0.4  /  DBili  x   /  AST  19  /  ALT  30  /  AlkPhos  90  10-13    PT/INR - ( 12 Oct 2023 18:38 )   PT: 14.5 sec;   INR: 1.28          PTT - ( 12 Oct 2023 18:38 )  PTT:35.4 sec      10-12 @ 07:01  -  10-13 @ 07:00  --------------------------------------------------------  IN: 0 mL / OUT: 325 mL / NET: -325 mL    10-13 @ 07:01  -  10-13 @ 15:01  --------------------------------------------------------  IN: 0 mL / OUT: 200 mL / NET: -200 mL      Daily Height in cm: 187.96 (12 Oct 2023 23:00)    Daily

## 2023-10-13 NOTE — DISCHARGE NOTE PROVIDER - CARE PROVIDER_API CALL
Robert Eaton  [address] [place] [address]  Phone: [phone]  Fax: (   )    -  Scheduled Appointment: 10/20/2023 02:45 PM   Robert Eaton  [address] [place] [address]  Phone: [phone]  Fax: (   )    -  Scheduled Appointment: 10/20/2023 02:45 PM    Antonella Arciniega  Pulmonary Disease  [address] [place] [address]  Phone: [phone]  Fax: [phone]  Follow Up Time: 2 weeks   Antonella Arciniega  Pulmonary Disease  [address], [address] [place] [address]  Phone: [phone]  Fax: [phone]  Follow Up Time: 2 weeks    Robert Eaton  [address] [place] [address]  Phone: [phone]  Fax: (   )    -  Scheduled Appointment: 10/20/2023 02:45 PM    Bo Campbell  Cardiac Electrophysiology  [address], [address] [place] [address]  Phone: [phone]  Fax: [phone]  Follow Up Time:

## 2023-10-13 NOTE — PROGRESS NOTE ADULT - ASSESSMENT
48 M former marijuana user (last used 1 month ago) FAIR HISTORIAN with FHx Heart Disease (Mother) and PMH HTN, HLD, DM Type II, ? Hepatitis A, Chronic Back pain s/p Fall s/p Back Surgery 8/2019, GIB (underwent endoscopy and colonoscopy 2 months ago with no source of bleed seen, polyp removed with recommendation to come back in 1 year)  who presented to [facility] ED 10/12/13 c/o intermittent episodes of chest heaviness a/w lightheadedness, palpitations, nausea, diaphoresis, SOB. Troponin negative and EKG nonischemic admitted for R/O ACS      48 M former marijuana user (last used 1 month ago) with FHx Heart Disease (Mother) and PMH HTN, HLD, DM Type II, ? Hepatitis A, Chronic Back pain s/p Fall s/p Back Surgery 8/2019, GIB (underwent endoscopy and colonoscopy 2 months ago with no source of bleed seen)  who presented to [facility] ED 10/12/13 c/o intermittent episodes of chest heaviness a/w lightheadedness, palpitations, nausea, diaphoresis, SOB. Troponin negative, EKG nonischemic, D-dimer elevated admitted for R/O ACS. Pending CCTA and CTA chest PE/RO.

## 2023-10-13 NOTE — PROGRESS NOTE ADULT - PROBLEM SELECTOR PLAN 3
BP stable. Continue Lisinopril. Hold HCTZ  given Hypokalemia and may be causing /contributing to syncopal episodes. BP stable.   -Continue Lisinopril 30mg daily. Hold HCTZ given Hypokalemia and may be causing /contributing to syncopal episodes.

## 2023-10-13 NOTE — DISCHARGE NOTE PROVIDER - NSDCMRMEDTOKEN_GEN_ALL_CORE_FT
alogliptin-metformin 12.5 mg-1000 mg oral tablet: 1 tab(s) orally 2 times a day  atorvastatin 40 mg oral tablet: 1 tab(s) orally once a day (at bedtime)  baclofen 20 mg oral tablet: 1 tab(s) orally 2 times a day as needed for Pain Pa  hydroCHLOROthiazide 50 mg oral tablet: 1 tab(s) orally once a day  lisinopril 30 mg oral tablet: 1 tab(s) orally once a day  naproxen 500 mg oral tablet: 1 tab(s) orally 2 times a day as needed for Pain   alogliptin-metformin 12.5 mg-1000 mg oral tablet: 1 tab(s) orally 2 times a day  aspirin 81 mg oral delayed release tablet: 1 tab(s) orally once a day  atorvastatin 40 mg oral tablet: 1 tab(s) orally once a day (at bedtime)  baclofen 20 mg oral tablet: 1 tab(s) orally 2 times a day as needed for Pain Pa  clopidogrel 75 mg oral tablet: 1 tab(s) orally once a day  lisinopril 30 mg oral tablet: 1 tab(s) orally once a day   alogliptin-metformin 12.5 mg-1000 mg oral tablet: 1 tab(s) orally 2 times a day  aspirin 81 mg oral delayed release tablet: 1 tab(s) orally once a day  Aspirin Enteric Coated 81 mg oral delayed release tablet: 1 tab(s) orally once a day  atorvastatin 80 mg oral tablet: 1 tab(s) orally once a day (at bedtime)  atorvastatin 80 mg oral tablet: 1 tab(s) orally once a day (at bedtime)  baclofen 20 mg oral tablet: 1 tab(s) orally 2 times a day as needed for Pain Pa  Cardiac Rehab: 2-3x/week for 12 weeks. Dx CAD with recent PCI. Dr. Robert Eaton [phone]  clopidogrel 75 mg oral tablet: 1 tab(s) orally once a day  lisinopril 30 mg oral tablet: 1 tab(s) orally once a day  lisinopril 30 mg oral tablet: 1 tab(s) orally once a day  Plavix 75 mg oral tablet: 1 tab(s) orally once a day

## 2023-10-13 NOTE — DISCHARGE NOTE PROVIDER - PROVIDER TOKENS
FREE:[LAST:[Ali],FIRST:[Robert],PHONE:[phone]],FAX:[(   )    -],ADDRESS:[[address] [place] [address]],SCHEDULEDAPPT:[10/20/2023],SCHEDULEDAPPTTIME:[02:45 PM]] FREE:[LAST:[Ali],FIRST:[Robret],PHONE:[phone]],FAX:[(   )    -],ADDRESS:[[address] [place] [address]],SCHEDULEDAPPT:[10/20/2023],SCHEDULEDAPPTTIME:[02:45 PM]],PROVIDER:[TOKEN:[[id number]],FOLLOWUP:[2 weeks]] PROVIDER:[TOKEN:[[id number]],FOLLOWUP:[2 weeks]],FREE:[LAST:[Ali],FIRST:[Robert],PHONE:[phone]],FAX:[(   )    -],ADDRESS:[[address] [place] [address]],SCHEDULEDAPPT:[10/20/2023],SCHEDULEDAPPTTIME:[02:45 PM]],PROVIDER:[TOKEN:[[id number]]]

## 2023-10-13 NOTE — PROGRESS NOTE ADULT - PROBLEM SELECTOR PLAN 1
-Patient reports exertional C/P with SOB, lightheadedness, diaphoresis, palpitations, nausea   -Troponin T Hs 25->22 (negative) .  EKG non-ischemic. Follow-up repeat EKG in AM.    -NPO for possible CCTA (Tachycardia may preclude this test) vs NST  -Echocardiogram ordered. Follow-up results.   -Continue telemetry   -s/p  mg PO x 1 in ER. Continue ASA 81 mg Daily -Pt reports exertional C/P with SOB, lightheadedness, diaphoresis, palpitations, nausea   -Troponin T Hs 25->22 (negative) . D-dimer 648. EKG non-ischemic.   -ECHO 10/13/23: EF 61%, normal biventricular size and systolic function, No significant valvular disease, No evidence of pHTN, No pericardial effusion.  -CCTA performed 10/13, f/u results   -CTA chest PE protocol performed 10/13, f/u results   -s/p  mg PO x 1 in ER. Continue ASA 81 mg Daily

## 2023-10-13 NOTE — PROGRESS NOTE ADULT - PROBLEM SELECTOR PLAN 4
, HDL 31, Total Cholesterol 163, Triglycerides 63.   -Follow-up Fasting Lipid Panel in AM   -Continue Atorvastatin 40 mg PO QHS , HDL 28, Total Cholesterol 152, Triglycerides 86.   -Continue Atorvastatin 40 mg PO QHS

## 2023-10-13 NOTE — DISCHARGE NOTE PROVIDER - HOSPITAL COURSE
***INCOMPLETE***    48M, former marijuana user, w/ FHx of Heart Disease and PMHx of HTN, HLD, DM-II, GIB (s/p endoscopy/colonoscopy 2 month ago w/o source of bleeding), ? Hepatitis A, and Chronic Back pain 2/2 Back Surgery 8/2019, presented to [facility] ED c/o intermittent episodes of chest heaviness a/w lightheadedness, palpitations, nausea, diaphoresis, SOB which are present w/ exertion and relieved with rest. Patient reports he has been experiencing these symptoms since Summer 2023 however feels they have gotten worse.   In ED, VSS, Labs significant for hsTropT 25 > 22, , K 3.4 and Ddimer 648. EKG: SR w/ PACs and TWI in III. Pt admitted to cardiac tele for r/o ACS.    Pt remained CP free and HD stable. No events on telemetry, EKG remained unchanged and TTE revealed ________. ?? CCTA/NST ?? revealed ____. LE Duplex revealed _______. ?? EP consulted for h/o frequent syncope, ____.     Pt seen and examined at bedside this AM without any complaints or events overnight, VSS, labs and telemetry reviewed and pt stable for discharge as discussed with  ___. Pt has received appropriate discharge instructions, including medication regimen, access site management and follow up with  __ in 1-2 weeks.    Discharge medications: ______________. ***LAST UPDATE 10/15    48M, former marijuana user, w/ FHx of Heart Disease and PMHx of HTN, HLD, DM-II, GIB (s/p endoscopy/colonoscopy 2 month ago w/o source of bleeding), ?Hepatitis A, and Chronic Back pain 2/2 Back Surgery 8/2019, presented to [facility] ED c/o intermittent episodes of chest heaviness a/w lightheadedness, palpitations, nausea, diaphoresis, SOB which are present w/ exertion and relieved with rest. Patient reports he has been experiencing these symptoms since Summer 2023 however feels they have gotten worse.     In ED, VSS, Labs significant for hsTropT 25 > 22, , K 3.4 and Ddimer 648. EKG: SR w/ PACs and TWI in III. CTPE negative; CCTA with calcium score is 197 Agatston units, 98th percentile, mLCx calcific obscures the vessel lumen where stenosis severity is indeterminate; followed by an area of possible severe stenosis, Mild LAD stenosis, Remaining coronary segments appear. Pt was then admitted to [facility] cardiology service for further management and work-up.     Given CCTA unable to r/o significant LCx stenosis, pt underwent LHC on 10/16/23 with: ___     Additionally, given hx of multiple syncope episodes, home HCTZ 50mg QD was held during hospital course an EP was consulted and recommended ___.    Pt seen and examined at bedside this AM without any complaints or events overnight, VSS, labs and telemetry reviewed and pt stable for discharge as discussed with  ___. Pt has received appropriate discharge instructions, including medication regimen, access site management and follow up with  __ in 1-2 weeks.    CV REGIMEN UPON DISCHARGE  DAPT:  Statin: Atorvastatin 40mg QHS      ***LAST UPDATE 10/16    48M, former marijuana user, w/ FHx of Heart Disease and PMHx of HTN, HLD, DM-II, GIB (s/p endoscopy/colonoscopy 2 month ago w/o source of bleeding), ?Hepatitis A, and Chronic Back pain 2/2 Back Surgery 8/2019, presented to [facility] ED c/o intermittent episodes of chest heaviness a/w lightheadedness, palpitations, nausea, diaphoresis, SOB which are present w/ exertion and relieved with rest. Patient reports he has been experiencing these symptoms since Summer 2023 however feels they have gotten worse.     In ED, VSS, Labs significant for hsTropT 25 > 22, , K 3.4 and Ddimer 648. EKG: SR w/ PACs and TWI in III. CTPE negative; CCTA with calcium score is 197 Agatston units, 98th percentile, mLCx calcific obscures the vessel lumen where stenosis severity is indeterminate; followed by an area of possible severe stenosis, Mild LAD stenosis, Remaining coronary segments appear. Pt was then admitted to [facility] cardiology service for further management and work-up.     Given CCTA unable to r/o significant LCx stenosis, pt underwent LHC on 10/16/23 with CHINA x1 to mLCx (78%); rest mild diffuse disease; EDP 5 -- via RRA; no complications [Interventionalist: Dr. CHERYL Bains].       Additionally, given hx of multiple syncope episodes, home HCTZ 50mg QD was held during hospital course an EP was consulted and recommended ___.    Pt seen and examined at bedside this AM without any complaints or events overnight, VSS, labs and telemetry reviewed and pt stable for discharge as discussed with  ___. Pt has received appropriate discharge instructions, including medication regimen, access site management and follow up with  __ in 1-2 weeks.    CV REGIMEN UPON DISCHARGE  DAPT: ASA 81mg QD and Plavix 75mg QD  Statin: Atorvastatin 80mg QHS   Lisinopril 30mg QD       48M, former marijuana user, w/ FHx of Heart Disease and PMHx of HTN, HLD, DM-II, GIB (s/p endoscopy/colonoscopy 2 month ago w/o source of bleeding), ?Hepatitis A, and Chronic Back pain 2/2 Back Surgery 8/2019, presented to [facility] ED c/o intermittent episodes of chest heaviness a/w lightheadedness, palpitations, nausea, diaphoresis, SOB which are present w/ exertion and relieved with rest. Patient reports he has been experiencing these symptoms since Summer 2023 however feels they have gotten worse.     In ED, VSS, Labs significant for hsTropT 25 > 22, , K 3.4 and Ddimer 648. EKG: SR w/ PACs and TWI in III. CTPE negative; CCTA with calcium score is 197 Agatston units, 98th percentile, mLCx calcific obscures the vessel lumen where stenosis severity is indeterminate; followed by an area of possible severe stenosis, Mild LAD stenosis, Remaining coronary segments appear. Pt was then admitted to [facility] cardiology service for further management and work-up.     Given CCTA unable to r/o significant LCx stenosis, pt underwent LHC on 10/16/23 with CHINA x1 to mLCx (78%); rest mild diffuse disease; EDP 5 -- via RRA; no complications [Interventionalist: Dr. CHERYL Bains].     Additionally, given hx of multiple syncope episodes, home HCTZ 50mg QD was held during hospital course an EP was consulted and recommended ___.    Pt seen and examined at bedside this AM without any complaints or events overnight, VSS, labs and telemetry reviewed and pt stable for discharge as discussed with Dr. Al. Pt has received appropriate discharge instructions, including medication regimen, access site management and follow up with cardiologist Dr. Robert Eaton on 10/20/23 at 2:45pm.     CV REGIMEN UPON DISCHARGE  DAPT: ASA 81mg QD and Plavix 75mg QD  Statin: Atorvastatin 80mg QHS   Lisinopril 30mg QD    OTHER MEDICATIONS UPON DISCHARGE  Alogliptin-Metformin 12.5-1000mg BID       48M, former marijuana user, w/ FHx of Heart Disease and PMHx of HTN, HLD, DM-II, GIB (s/p endoscopy/colonoscopy 2 month ago w/o source of bleeding), ?Hepatitis A, and Chronic Back pain 2/2 Back Surgery 8/2019, presented to [facility] ED c/o intermittent episodes of chest heaviness a/w lightheadedness, palpitations, nausea, diaphoresis, SOB which are present w/ exertion and relieved with rest. Patient reports he has been experiencing these symptoms since Summer 2023 however feels they have gotten worse.     In ED, VSS, Labs significant for hsTropT 25 > 22, , K 3.4 and Ddimer 648. EKG: SR w/ PACs and TWI in III. CTPE negative; CCTA with calcium score is 197 Agatston units, 98th percentile, mLCx calcific obscures the vessel lumen where stenosis severity is indeterminate; followed by an area of possible severe stenosis, Mild LAD stenosis, Remaining coronary segments appear. Pt was then admitted to [facility] cardiology service for further management and work-up.     Given CCTA unable to r/o significant LCx stenosis, pt underwent LHC on 10/16/23 with CHINA x1 to mLCx (78%); rest mild diffuse disease; EDP 5 -- via RRA; no complications [Interventionalist: Dr. CHERYL Bains].     Additionally, given hx of multiple syncope episodes, home HCTZ 50mg QD was held during hospital course an EP was consulted and recommended ___. 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 Pulm consulted and recommended given pt has been HDS without hypoxia, no further intervention or AC indication is needed.     Pt seen and examined at bedside this AM without any complaints or events overnight, VSS, labs and telemetry reviewed and pt stable for discharge as discussed with Dr. Al. Pt has received appropriate discharge instructions, including medication regimen, access site management and follow up with cardiologist Dr. Robert Eaton on 10/20/23 at 2:45pm.     CV REGIMEN UPON DISCHARGE  DAPT: ASA 81mg QD and Plavix 75mg QD  Statin: Atorvastatin 80mg QHS   Lisinopril 30mg QD    OTHER MEDICATIONS UPON DISCHARGE  Alogliptin-Metformin 12.5-1000mg BID       48M, former marijuana user, w/ FHx of Heart Disease and PMHx of HTN, HLD, DM-II, GIB (s/p endoscopy/colonoscopy 2 month ago w/o source of bleeding), ?Hepatitis A, and Chronic Back pain 2/2 Back Surgery 8/2019, presented to [facility] ED c/o intermittent episodes of chest heaviness a/w lightheadedness, palpitations, nausea, diaphoresis, SOB which are present w/ exertion and relieved with rest. Patient reports he has been experiencing these symptoms since Summer 2023 however feels they have gotten worse.     In ED, VSS, Labs significant for hsTropT 25 > 22, , K 3.4 and Ddimer 648. EKG: SR w/ PACs and TWI in III. CTPE negative; CCTA with calcium score is 197 Agatston units, 98th percentile, mLCx calcific obscures the vessel lumen where stenosis severity is indeterminate; followed by an area of possible severe stenosis, Mild LAD stenosis, Remaining coronary segments appear. Pt was then admitted to [facility] cardiology service for further management and work-up.     Given CCTA unable to r/o significant LCx stenosis, pt underwent LHC on 10/16/23 with CHINA x1 to mLCx (78%); rest mild diffuse disease; EDP 5 -- via RRA; no complications [Interventionalist: Dr. CHERYL Bains].     Additionally, given hx of multiple syncope episodes, home HCTZ 50mg QD was held during hospital course an EP was consulted and recommended Holter monitor. 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 Lower extremity US negative for DVTs. Pulm consulted and recommended given pt has been HDS without hypoxia, no further intervention or AC indication is needed.     Pt seen and examined at bedside this AM without any complaints or events overnight, VSS, labs and telemetry reviewed and pt stable for discharge as discussed with Dr. Al. Pt has received appropriate discharge instructions, including medication regimen, access site management and follow up with cardiologist Dr. Robert Eaton on 10/20/23 at 2:45pm.     CV REGIMEN UPON DISCHARGE  DAPT: ASA 81mg QD and Plavix 75mg QD  Statin: Atorvastatin 80mg QHS   Lisinopril 30mg QD    OTHER MEDICATIONS UPON DISCHARGE  Alogliptin-Metformin 12.5-1000mg BID

## 2023-10-13 NOTE — DISCHARGE NOTE PROVIDER - NSDCCPCAREPLAN_GEN_ALL_CORE_FT
PRINCIPAL DISCHARGE DIAGNOSIS  Diagnosis: Chest pain  Assessment and Plan of Treatment:       SECONDARY DISCHARGE DIAGNOSES  Diagnosis: HTN (hypertension)  Assessment and Plan of Treatment: Please continue ____ as listed to keep your blood pressure controlled. For blood pressure that is too high or too low please see your doctor or go to the emergency room as necessary.    Diagnosis: HLD (hyperlipidemia)  Assessment and Plan of Treatment: Please continue ____ at bedtime to keep your cholesterol low. High cholesterol contributes to heart disease.    Diagnosis: Diabetes mellitus, type 2  Assessment and Plan of Treatment: Your Hemoglobin A1c is ___ and your goal A1c is less than 7.0%. This number measures your average blood sugar level over the last three months.  Please continue ___ as listed for diabetes. Maintain a low carbohydrate, low sugar diet, exercise, monitor your fingerstick blood sugars regarly and follow up with your Endocrinologist/Primary Care Doctor.     PRINCIPAL DISCHARGE DIAGNOSIS  Diagnosis: CAD (coronary artery disease)  Assessment and Plan of Treatment: You were found to have blockages in the arteries of your heart, also known as Coronary Artery Disease. You underwent a cardiac catheterization on 10/16 and received a stent to the Left circumflex artery.  PLEASE CONTINUE ASPIRIN 81MG DAILY AND PLAVIX 75MG DAILY. DO NOT STOP THESE MEDICATIONS FOR ANY REASON AS THEY ARE KEEPING YOUR STENT OPEN AND PREVENTING A HEART ATTACK.   Aspirin and Plavix can put you at increased risk of bleeding; please avoid NSAIDS (such as Motrin, Advil, Ibuprofen, Naproxen, or Aleve, as these medications may further your risk of bleeding  Avoid strenuous activity or heavy lifting anything more than 5lbs for the next five days. Do not take a bath or swim for the next five days; you may shower. For any bleeding or hematoma formation (hardened blood collection under the skin) at the access site of your right wrist please hold pressure and go to the emergency room.   Please follow up with Dr. Robert Eaton on 10/20/23 at 2:45pm at [address]. For recurrent chest pain, please call your doctor or go to the emergency room.  We have provided you with a prescription for cardiac rehab which is medically supervised exercise program for your heart and has been shown to improve the quantity and quality of life of people with heart disease like yours. You should attend cardiac rehab 3 times per week for 12 weeks. We have provided you with a list of nearby facilities. Please call your insurance carrier to determine which of these facilities are covered under your plan. Please bring this prescription with you to your follow up appointment with your cardiologist who can then further assist you to enroll into a cardiac rehab program.      SECONDARY DISCHARGE DIAGNOSES  Diagnosis: HTN (hypertension)  Assessment and Plan of Treatment: Please continue Lisinopril 30mg daily as listed to keep your blood pressure controlled. For blood pressure that is too high or too low please see your doctor or go to the emergency room as necessary.    Diagnosis: HLD (hyperlipidemia)  Assessment and Plan of Treatment: Please continue Atorvastatin 80mg at bedtime to keep your cholesterol low. High cholesterol contributes to heart disease.    Diagnosis: Diabetes mellitus, type 2  Assessment and Plan of Treatment: Your Hemoglobin A1c is 6.4 and your goal A1c is less than 7.0%. This number measures your average blood sugar level over the last three months.  Please continue Alogliptin-Metformin 12.5-1000ng twice daily as listed for diabetes.   Maintain a low carbohydrate, low sugar diet, exercise, monitor your fingerstick blood sugars regarly and follow up with your Endocrinologist/Primary Care Doctor.

## 2023-10-13 NOTE — PROGRESS NOTE ADULT - PROBLEM SELECTOR PLAN 2
Etiology possibly vasovagal in nature given symptomatology   -Orthostatics. UTox   -Telemetry to r/o arrhythmias   -TFTs   -Consider EP consult for possible event monitor/loop recorder     R/O PE/DVT: Add On D-dimer: 648. Cannot use age adjusted d-dimer as patient <50. Tachycardia and patient with limited mobility given back surgery and stays home often due to fear of syncope outside. No tachypnea, No hypoxia, No LE edema or calf pain. Troponing negative. LE Venous Duplex ordered can consider CTA R/O PE if clinically indicated. Etiology possibly vasovagal in nature given symptomatology   -Orthostatics neg. UTox pending    -No arrhythmias on tele   -TFTs wnl  -Consider EP consult for possible event monitor/loop recorder Etiology possibly vasovagal in nature given symptomatology   -Orthostatics neg. UTox pending    -No arrhythmias on tele   -TFTs wnl  -PAC'c on tele. Consider EP consult for possible event monitor/loop recorder

## 2023-10-14 LAB
ANION GAP SERPL CALC-SCNC: 11 MMOL/L — SIGNIFICANT CHANGE UP (ref 5–17)
BASOPHILS # BLD AUTO: 0.09 K/UL — SIGNIFICANT CHANGE UP (ref 0–0.2)
BASOPHILS NFR BLD AUTO: 1.9 % — SIGNIFICANT CHANGE UP (ref 0–2)
BUN SERPL-MCNC: 18 MG/DL — SIGNIFICANT CHANGE UP (ref 7–23)
CALCIUM SERPL-MCNC: 9.2 MG/DL — SIGNIFICANT CHANGE UP (ref 8.4–10.5)
CHLORIDE SERPL-SCNC: 97 MMOL/L — SIGNIFICANT CHANGE UP (ref 96–108)
CO2 SERPL-SCNC: 27 MMOL/L — SIGNIFICANT CHANGE UP (ref 22–31)
CREAT SERPL-MCNC: 1.08 MG/DL — SIGNIFICANT CHANGE UP (ref 0.5–1.3)
EGFR: 85 ML/MIN/1.73M2 — SIGNIFICANT CHANGE UP
EOSINOPHIL # BLD AUTO: 0.14 K/UL — SIGNIFICANT CHANGE UP (ref 0–0.5)
EOSINOPHIL NFR BLD AUTO: 3 % — SIGNIFICANT CHANGE UP (ref 0–6)
GLUCOSE BLDC GLUCOMTR-MCNC: 105 MG/DL — HIGH (ref 70–99)
GLUCOSE BLDC GLUCOMTR-MCNC: 115 MG/DL — HIGH (ref 70–99)
GLUCOSE BLDC GLUCOMTR-MCNC: 170 MG/DL — HIGH (ref 70–99)
GLUCOSE BLDC GLUCOMTR-MCNC: 95 MG/DL — SIGNIFICANT CHANGE UP (ref 70–99)
GLUCOSE SERPL-MCNC: 78 MG/DL — SIGNIFICANT CHANGE UP (ref 70–99)
HCT VFR BLD CALC: 41.7 % — SIGNIFICANT CHANGE UP (ref 39–50)
HGB BLD-MCNC: 13.8 G/DL — SIGNIFICANT CHANGE UP (ref 13–17)
IMM GRANULOCYTES NFR BLD AUTO: 0.2 % — SIGNIFICANT CHANGE UP (ref 0–0.9)
LYMPHOCYTES # BLD AUTO: 2.45 K/UL — SIGNIFICANT CHANGE UP (ref 1–3.3)
LYMPHOCYTES # BLD AUTO: 52.2 % — HIGH (ref 13–44)
MAGNESIUM SERPL-MCNC: 1.8 MG/DL — SIGNIFICANT CHANGE UP (ref 1.6–2.6)
MCHC RBC-ENTMCNC: 28.6 PG — SIGNIFICANT CHANGE UP (ref 27–34)
MCHC RBC-ENTMCNC: 33.1 GM/DL — SIGNIFICANT CHANGE UP (ref 32–36)
MCV RBC AUTO: 86.3 FL — SIGNIFICANT CHANGE UP (ref 80–100)
MONOCYTES # BLD AUTO: 0.4 K/UL — SIGNIFICANT CHANGE UP (ref 0–0.9)
MONOCYTES NFR BLD AUTO: 8.5 % — SIGNIFICANT CHANGE UP (ref 2–14)
NEUTROPHILS # BLD AUTO: 1.6 K/UL — LOW (ref 1.8–7.4)
NEUTROPHILS NFR BLD AUTO: 34.2 % — LOW (ref 43–77)
NRBC # BLD: 0 /100 WBCS — SIGNIFICANT CHANGE UP (ref 0–0)
PLATELET # BLD AUTO: 247 K/UL — SIGNIFICANT CHANGE UP (ref 150–400)
POTASSIUM SERPL-MCNC: 3.6 MMOL/L — SIGNIFICANT CHANGE UP (ref 3.5–5.3)
POTASSIUM SERPL-SCNC: 3.6 MMOL/L — SIGNIFICANT CHANGE UP (ref 3.5–5.3)
RBC # BLD: 4.83 M/UL — SIGNIFICANT CHANGE UP (ref 4.2–5.8)
RBC # FLD: 13.4 % — SIGNIFICANT CHANGE UP (ref 10.3–14.5)
SODIUM SERPL-SCNC: 135 MMOL/L — SIGNIFICANT CHANGE UP (ref 135–145)
WBC # BLD: 4.69 K/UL — SIGNIFICANT CHANGE UP (ref 3.8–10.5)
WBC # FLD AUTO: 4.69 K/UL — SIGNIFICANT CHANGE UP (ref 3.8–10.5)

## 2023-10-14 PROCEDURE — 99233 SBSQ HOSP IP/OBS HIGH 50: CPT

## 2023-10-14 RX ORDER — ONDANSETRON 8 MG/1
4 TABLET, FILM COATED ORAL ONCE
Refills: 0 | Status: COMPLETED | OUTPATIENT
Start: 2023-10-14 | End: 2023-10-14

## 2023-10-14 RX ORDER — POTASSIUM CHLORIDE 20 MEQ
40 PACKET (EA) ORAL ONCE
Refills: 0 | Status: COMPLETED | OUTPATIENT
Start: 2023-10-14 | End: 2023-10-14

## 2023-10-14 RX ORDER — MAGNESIUM OXIDE 400 MG ORAL TABLET 241.3 MG
800 TABLET ORAL ONCE
Refills: 0 | Status: COMPLETED | OUTPATIENT
Start: 2023-10-14 | End: 2023-10-14

## 2023-10-14 RX ORDER — SENNA PLUS 8.6 MG/1
1 TABLET ORAL DAILY
Refills: 0 | Status: DISCONTINUED | OUTPATIENT
Start: 2023-10-14 | End: 2023-10-17

## 2023-10-14 RX ADMIN — LISINOPRIL 30 MILLIGRAM(S): 2.5 TABLET ORAL at 06:22

## 2023-10-14 RX ADMIN — Medication 81 MILLIGRAM(S): at 10:15

## 2023-10-14 RX ADMIN — Medication 40 MILLIEQUIVALENT(S): at 10:10

## 2023-10-14 RX ADMIN — MAGNESIUM OXIDE 400 MG ORAL TABLET 800 MILLIGRAM(S): 241.3 TABLET ORAL at 10:10

## 2023-10-14 RX ADMIN — ONDANSETRON 4 MILLIGRAM(S): 8 TABLET, FILM COATED ORAL at 22:24

## 2023-10-14 RX ADMIN — ONDANSETRON 4 MILLIGRAM(S): 8 TABLET, FILM COATED ORAL at 19:07

## 2023-10-14 RX ADMIN — Medication 2: at 12:38

## 2023-10-14 RX ADMIN — HEPARIN SODIUM 5000 UNIT(S): 5000 INJECTION INTRAVENOUS; SUBCUTANEOUS at 14:12

## 2023-10-14 RX ADMIN — SENNA PLUS 1 TABLET(S): 8.6 TABLET ORAL at 10:15

## 2023-10-14 RX ADMIN — HEPARIN SODIUM 5000 UNIT(S): 5000 INJECTION INTRAVENOUS; SUBCUTANEOUS at 06:22

## 2023-10-14 NOTE — PROGRESS NOTE ADULT - PROBLEM SELECTOR PLAN 3
BP stable.   -Continue Lisinopril 30mg daily. Hold HCTZ given Hypokalemia and may be causing /contributing to syncopal episodes.

## 2023-10-14 NOTE — PROGRESS NOTE ADULT - PROBLEM SELECTOR PLAN 1
-Pt reports exertional C/P with SOB, lightheadedness, diaphoresis, palpitations, nausea   -Troponin T Hs 25->22 (negative) . D-dimer 648. EKG non-ischemic.   -ECHO 10/13/23: EF 61%, normal biventricular size and systolic function, No significant valvular disease, No evidence of pHTN, No pericardial effusion.  -CCTA performed 10/13, f/u results   -CTA chest PE protocol performed 10/13, f/u results   -s/p  mg PO x 1 in ER. Continue ASA 81 mg Daily -Pt reports exertional C/P with SOB, lightheadedness, diaphoresis, palpitations, nausea   -Troponin T Hs 25->22 (negative) . D-dimer 648. EKG non-ischemic.   -ECHO 10/13: EF 61%, normal biventricular size and systolic function, No significant valvular disease, No evidence of pHTN, No pericardial effusion.  -CTA chest PE r/o 10/13: neg for PE   -CCTA 10/13: calcium score is 197 Agatston units, 98th percentile, mLCx calcific obscures the vessel lumen where stenosis severity is indeterminate; followed by an area of possible severe stenosis, Mild LAD stenosis, Remaining coronary segments appear   -Plan for cardiac cath MOn 10/16, consented. Will need Plavix load, IVF and to be added to the schedule w/ Dr. Bains   -Continue ASA 81 mg Daily.

## 2023-10-14 NOTE — PROGRESS NOTE ADULT - PROBLEM SELECTOR PLAN 5
Hgb A1c 6.4  -Moderate Dose sliding scale      FULL CODE  DVT Prophylaxis: Heparin Sub Q  Dispo: Pending clinical progression Hgb A1c 6.4  -Holding PO meds. Moderate Dose sliding scale      FULL CODE  DVT Prophylaxis: Heparin Sub Q  Dispo: Pending clinical progression

## 2023-10-14 NOTE — PROGRESS NOTE ADULT - SUBJECTIVE AND OBJECTIVE BOX
S: Pt seen and examined bedside.  Patient denies C/P, SOB, N/V, dizziness, palpitations, and diaphoresis.  Pt denies fever/chills, dysuria, abdominal pain, diarrhea, and cough  12 Point ROS otherwise negative except as per HPI/subjective.     O: Vital Signs Last 24 Hrs  T(C): 36.4 (14 Oct 2023 06:21), Max: 36.8 (13 Oct 2023 20:52)  T(F): 97.6 (14 Oct 2023 06:21), Max: 98.2 (13 Oct 2023 20:52)  HR: 72 (14 Oct 2023 06:21) (70 - 75)  BP: 110/76 (14 Oct 2023 06:21) (105/66 - 113/72)  BP(mean): --  RR: 18 (14 Oct 2023 06:21) (18 - 18)  SpO2: 95% (14 Oct 2023 06:21) (95% - 97%)    Parameters below as of 14 Oct 2023 06:21  Patient On (Oxygen Delivery Method): room air        PHYSICAL EXAM:  GEN: NAD  HEENT: No JVD  PULM:  CTA B/L  CARD:  RRR, S1 and S2   ABD: +BS, NT, soft/ND	  EXT: No Edema B/L LE  NEURO: A+Ox3, no focal deficit  PSYCH: Mood Appropriate    LABS:                        13.8   4.69  )-----------( 247      ( 14 Oct 2023 05:30 )             41.7     10-14    135  |  97  |  18  ----------------------------<  78  3.6   |  27  |  1.08    Ca    9.2      14 Oct 2023 05:30  Mg     1.8     10-14    TPro  7.5  /  Alb  3.8  /  TBili  0.4  /  DBili  x   /  AST  19  /  ALT  30  /  AlkPhos  90  10-13    PT/INR - ( 12 Oct 2023 18:38 )   PT: 14.5 sec;   INR: 1.28          PTT - ( 12 Oct 2023 18:38 )  PTT:35.4 sec      10-13 @ 07:01  -  10-14 @ 07:00  --------------------------------------------------------  IN: 240 mL / OUT: 750 mL / NET: -510 mL      Daily     Daily    S: Pt seen and examined bedside. Pt reports he is feeling well, states he is happy to be in the hospital getting testing done.   Tele: ST low 100's, w/ PAC's   Patient denies C/P, SOB, N/V, dizziness, palpitations, and diaphoresis.  Pt denies fever/chills, dysuria, abdominal pain, diarrhea, and cough  12 Point ROS otherwise negative except as per HPI/subjective.     O: Vital Signs Last 24 Hrs  T(C): 36.4 (14 Oct 2023 06:21), Max: 36.8 (13 Oct 2023 20:52)  T(F): 97.6 (14 Oct 2023 06:21), Max: 98.2 (13 Oct 2023 20:52)  HR: 72 (14 Oct 2023 06:21) (70 - 75)  BP: 110/76 (14 Oct 2023 06:21) (105/66 - 113/72)  BP(mean): --  RR: 18 (14 Oct 2023 06:21) (18 - 18)  SpO2: 95% (14 Oct 2023 06:21) (95% - 97%)    Parameters below as of 14 Oct 2023 06:21  Patient On (Oxygen Delivery Method): room air        PHYSICAL EXAM:  GEN: NAD  HEENT: No JVD  PULM:  CTA B/L, no WRR  CARD:  RRR, S1 and S2, no murmurs  ABD: +BS, NT, soft/ND	  EXT: No Edema B/L LE, distal pulses intact   NEURO: A+Ox3, no focal deficit  PSYCH: Mood Appropriate    LABS:                        13.8   4.69  )-----------( 247      ( 14 Oct 2023 05:30 )             41.7     10-14    135  |  97  |  18  ----------------------------<  78  3.6   |  27  |  1.08    Ca    9.2      14 Oct 2023 05:30  Mg     1.8     10-14    TPro  7.5  /  Alb  3.8  /  TBili  0.4  /  DBili  x   /  AST  19  /  ALT  30  /  AlkPhos  90  10-13    PT/INR - ( 12 Oct 2023 18:38 )   PT: 14.5 sec;   INR: 1.28          PTT - ( 12 Oct 2023 18:38 )  PTT:35.4 sec      10-13 @ 07:01  -  10-14 @ 07:00  --------------------------------------------------------  IN: 240 mL / OUT: 750 mL / NET: -510 mL      Daily     Daily

## 2023-10-14 NOTE — PROGRESS NOTE ADULT - ASSESSMENT
48 M former marijuana user (last used 1 month ago) with FHx Heart Disease (Mother) and PMH HTN, HLD, DM Type II, ? Hepatitis A, Chronic Back pain s/p Fall s/p Back Surgery 8/2019, GIB (underwent endoscopy and colonoscopy 2 months ago with no source of bleed seen)  who presented to [facility] ED 10/12/13 c/o intermittent episodes of chest heaviness a/w lightheadedness, palpitations, nausea, diaphoresis, SOB. Troponin negative, EKG nonischemic, D-dimer elevated admitted for R/O ACS. Pending CCTA and CTA chest PE/RO.      48 M former marijuana user (last used 1 month ago) with FHx Heart Disease (Mother) and PMH HTN, HLD, DM Type II, ? Hepatitis A, Chronic Back pain s/p Fall s/p Back Surgery 8/2019, GIB (underwent endoscopy and colonoscopy 2 months ago with no source of bleed seen)  who presented to [facility] ED 10/12/13 c/o intermittent episodes of chest heaviness a/w lightheadedness, palpitations, nausea, diaphoresis, SOB. Trop neg, EKG nonischemic. CTA chest neg for PE. CCTA 10/13 unable to r/o sig LCx stenosis, ECHO nl. Plan for cardiac cath on Mon 10/16.      48 M former marijuana user (last used 1 month ago) with FHx Heart Disease (Mother) and PMH HTN, HLD, DM Type II, ? Hepatitis A, Chronic Back pain s/p Fall s/p Back Surgery 8/2019, GIB (underwent endoscopy and colonoscopy 2 months ago with no source of bleed seen)  who presented to [facility] ED 10/12/13 c/o intermittent episodes of chest heaviness a/w lightheadedness, palpitations, nausea, diaphoresis, SOB as well as multiple syncopal episodes over the past several months. Trop neg, EKG nonischemic. CTA chest neg for PE. CCTA 10/13 unable to r/o sig LCx stenosis, ECHO nl. Plan for cardiac cath on Mon 10/16.

## 2023-10-14 NOTE — PROGRESS NOTE ADULT - PROBLEM SELECTOR PLAN 2
Etiology possibly vasovagal in nature given symptomatology   -Orthostatics neg. UTox pending    -No arrhythmias on tele   -TFTs wnl  -PAC'c on tele. Consider EP consult for possible event monitor/loop recorder Etiology possibly vasovagal in nature given symptomatology   -Orthostatics neg. UTox pending    -ST low 100's w/ PAC's on tele   -TFTs wnl  -Consider EP consult for possible event monitor/loop recorder Etiology possibly vasovagal in nature given symptomatology   -Orthostatics neg. UTox pending    -ST low 100's w/ PAC's on tele   -TFTs wnl  -Nl ECHO as above   -Consider EP consult for possible event monitor/loop recorder

## 2023-10-15 LAB
ANION GAP SERPL CALC-SCNC: 9 MMOL/L — SIGNIFICANT CHANGE UP (ref 5–17)
BASOPHILS # BLD AUTO: 0.04 K/UL — SIGNIFICANT CHANGE UP (ref 0–0.2)
BASOPHILS NFR BLD AUTO: 0.8 % — SIGNIFICANT CHANGE UP (ref 0–2)
BUN SERPL-MCNC: 17 MG/DL — SIGNIFICANT CHANGE UP (ref 7–23)
BURR CELLS BLD QL SMEAR: PRESENT — SIGNIFICANT CHANGE UP
CALCIUM SERPL-MCNC: 9 MG/DL — SIGNIFICANT CHANGE UP (ref 8.4–10.5)
CHLORIDE SERPL-SCNC: 97 MMOL/L — SIGNIFICANT CHANGE UP (ref 96–108)
CO2 SERPL-SCNC: 28 MMOL/L — SIGNIFICANT CHANGE UP (ref 22–31)
CREAT SERPL-MCNC: 1.18 MG/DL — SIGNIFICANT CHANGE UP (ref 0.5–1.3)
EGFR: 76 ML/MIN/1.73M2 — SIGNIFICANT CHANGE UP
EOSINOPHIL # BLD AUTO: 0 K/UL — SIGNIFICANT CHANGE UP (ref 0–0.5)
EOSINOPHIL NFR BLD AUTO: 0 % — SIGNIFICANT CHANGE UP (ref 0–6)
GLUCOSE BLDC GLUCOMTR-MCNC: 134 MG/DL — HIGH (ref 70–99)
GLUCOSE BLDC GLUCOMTR-MCNC: 136 MG/DL — HIGH (ref 70–99)
GLUCOSE BLDC GLUCOMTR-MCNC: 139 MG/DL — HIGH (ref 70–99)
GLUCOSE BLDC GLUCOMTR-MCNC: 142 MG/DL — HIGH (ref 70–99)
GLUCOSE SERPL-MCNC: 132 MG/DL — HIGH (ref 70–99)
HCT VFR BLD CALC: 41.7 % — SIGNIFICANT CHANGE UP (ref 39–50)
HGB BLD-MCNC: 14.1 G/DL — SIGNIFICANT CHANGE UP (ref 13–17)
LYMPHOCYTES # BLD AUTO: 0.54 K/UL — LOW (ref 1–3.3)
LYMPHOCYTES # BLD AUTO: 12.2 % — LOW (ref 13–44)
MAGNESIUM SERPL-MCNC: 1.5 MG/DL — LOW (ref 1.6–2.6)
MANUAL SMEAR VERIFICATION: SIGNIFICANT CHANGE UP
MCHC RBC-ENTMCNC: 29.3 PG — SIGNIFICANT CHANGE UP (ref 27–34)
MCHC RBC-ENTMCNC: 33.8 GM/DL — SIGNIFICANT CHANGE UP (ref 32–36)
MCV RBC AUTO: 86.5 FL — SIGNIFICANT CHANGE UP (ref 80–100)
MONOCYTES # BLD AUTO: 0.04 K/UL — SIGNIFICANT CHANGE UP (ref 0–0.9)
MONOCYTES NFR BLD AUTO: 0.9 % — LOW (ref 2–14)
NEUTROPHILS # BLD AUTO: 3.8 K/UL — SIGNIFICANT CHANGE UP (ref 1.8–7.4)
NEUTROPHILS NFR BLD AUTO: 86.1 % — HIGH (ref 43–77)
OVALOCYTES BLD QL SMEAR: SLIGHT — SIGNIFICANT CHANGE UP
PLAT MORPH BLD: NORMAL — SIGNIFICANT CHANGE UP
PLATELET # BLD AUTO: 227 K/UL — SIGNIFICANT CHANGE UP (ref 150–400)
POIKILOCYTOSIS BLD QL AUTO: SLIGHT — SIGNIFICANT CHANGE UP
POLYCHROMASIA BLD QL SMEAR: SLIGHT — SIGNIFICANT CHANGE UP
POTASSIUM SERPL-MCNC: 4 MMOL/L — SIGNIFICANT CHANGE UP (ref 3.5–5.3)
POTASSIUM SERPL-SCNC: 4 MMOL/L — SIGNIFICANT CHANGE UP (ref 3.5–5.3)
RBC # BLD: 4.82 M/UL — SIGNIFICANT CHANGE UP (ref 4.2–5.8)
RBC # FLD: 13.4 % — SIGNIFICANT CHANGE UP (ref 10.3–14.5)
RBC BLD AUTO: ABNORMAL
SODIUM SERPL-SCNC: 134 MMOL/L — LOW (ref 135–145)
WBC # BLD: 4.41 K/UL — SIGNIFICANT CHANGE UP (ref 3.8–10.5)
WBC # FLD AUTO: 4.41 K/UL — SIGNIFICANT CHANGE UP (ref 3.8–10.5)

## 2023-10-15 PROCEDURE — 99233 SBSQ HOSP IP/OBS HIGH 50: CPT

## 2023-10-15 RX ORDER — MAGNESIUM OXIDE 400 MG ORAL TABLET 241.3 MG
800 TABLET ORAL ONCE
Refills: 0 | Status: COMPLETED | OUTPATIENT
Start: 2023-10-15 | End: 2023-10-15

## 2023-10-15 RX ORDER — ONDANSETRON 8 MG/1
4 TABLET, FILM COATED ORAL ONCE
Refills: 0 | Status: COMPLETED | OUTPATIENT
Start: 2023-10-15 | End: 2023-10-15

## 2023-10-15 RX ORDER — SODIUM CHLORIDE 9 MG/ML
1000 INJECTION INTRAMUSCULAR; INTRAVENOUS; SUBCUTANEOUS
Refills: 0 | Status: DISCONTINUED | OUTPATIENT
Start: 2023-10-15 | End: 2023-10-16

## 2023-10-15 RX ADMIN — MAGNESIUM OXIDE 400 MG ORAL TABLET 800 MILLIGRAM(S): 241.3 TABLET ORAL at 11:48

## 2023-10-15 RX ADMIN — Medication 81 MILLIGRAM(S): at 11:49

## 2023-10-15 RX ADMIN — HEPARIN SODIUM 5000 UNIT(S): 5000 INJECTION INTRAVENOUS; SUBCUTANEOUS at 06:12

## 2023-10-15 RX ADMIN — ONDANSETRON 4 MILLIGRAM(S): 8 TABLET, FILM COATED ORAL at 09:19

## 2023-10-15 RX ADMIN — ATORVASTATIN CALCIUM 40 MILLIGRAM(S): 80 TABLET, FILM COATED ORAL at 22:59

## 2023-10-15 RX ADMIN — HEPARIN SODIUM 5000 UNIT(S): 5000 INJECTION INTRAVENOUS; SUBCUTANEOUS at 13:18

## 2023-10-15 RX ADMIN — LISINOPRIL 30 MILLIGRAM(S): 2.5 TABLET ORAL at 07:15

## 2023-10-15 NOTE — PROGRESS NOTE ADULT - PROBLEM SELECTOR PLAN 1
- Continue ASA 81mg  -Troponin T Hs 25->22 (negative) . D-dimer 648. EKG non-ischemic.   - TTE (10/13/23): EF 61%, normal biventricular size and systolic function, No significant valvular disease, No evidence of pHTN, No pericardial effusion.  - CTA PE (10/13/23): neg for PE   - CCTA (10/13/23): Ca score 197 Agatston units, 98th percentile. mLCx calcific obscures the vessel lumen where stenosis severity is indeterminate; followed by an area of possible severe stenosis, Mild LAD stenosis, Remaining coronary segments appear   - Plan for LHC on 10/16/23 -- add on to schedule with Dr. Nara meraz Administer Plavix 600mg load 10/15    o Pre-cath fluids    o Consent in chart

## 2023-10-15 NOTE — PROGRESS NOTE ADULT - ASSESSMENT
48M former marijuana user (last used 1 month ago) with FHx Heart Disease (Mother) and hx of HTN, HLD, DM2, ? Hepatitis A, Chronic Back pain s/p fall s/p back surgery (8/2019), and GIB (negative EGD/C-scope, 2mos ago), presenting with chest heaviness a/w lightheadedness, palpitations, nausea, diaphoresis, SOB as well as multiple syncopal episodes over the past several months. Trop neg, EKG nonischemic; CTPE negative. CCTA (10/13) unable to r/o sig LCx stenosis, ECHO nl. Plan for cardiac cath on Mon 10/16.

## 2023-10-15 NOTE — PROGRESS NOTE ADULT - PROBLEM SELECTOR PLAN 2
- Continue Lisinopril 30mg QD  - Holding home HCTZ 50mg QD given hypokalemia and may be causing /contributing to syncopal episodes

## 2023-10-15 NOTE — PROGRESS NOTE ADULT - SUBJECTIVE AND OBJECTIVE BOX
CARDIOLOGY PA PROGRESS NOTE    Interval Events:  - Admitted yesterday for CP with negative troponin and EKG non-ischemic   - Plan for [facility] 10/16     Overnight Events: GABBY    Subjective:  - Pt seen and examined this AM sitting comfortably in bed. Feels well; no acute complaints of CP, palpitations, SOB, abdominal discomfort, N/V/D    ROS: Negative except per HPI and Subjective     Tele: NSR with frequent PVCs     MEDICATIONS:  lisinopril 30 milliGRAM(s) Oral daily  baclofen 20 milliGRAM(s) Oral every 12 hours PRN  senna 1 Tablet(s) Oral daily  atorvastatin 40 milliGRAM(s) Oral at bedtime  dextrose 50% Injectable 25 Gram(s) IV Push once  dextrose 50% Injectable 12.5 Gram(s) IV Push once  dextrose 50% Injectable 25 Gram(s) IV Push once  dextrose Oral Gel 15 Gram(s) Oral once PRN  glucagon  Injectable 1 milliGRAM(s) IntraMuscular once  insulin lispro (ADMELOG) corrective regimen sliding scale   SubCutaneous Before meals and at bedtime  aspirin enteric coated 81 milliGRAM(s) Oral daily  dextrose 5%. 1000 milliLiter(s) IV Continuous <Continuous>  dextrose 5%. 1000 milliLiter(s) IV Continuous <Continuous>  heparin   Injectable 5000 Unit(s) SubCutaneous every 8 hours  influenza   Vaccine 0.5 milliLiter(s) IntraMuscular once  magnesium oxide 800 milliGRAM(s) Oral once    	  VITAL SIGNS:  T(C): 36.8 (10-15-23 @ 08:34), Max: 37.1 (10-14-23 @ 14:09)  HR: 101 (10-15-23 @ 08:34) (88 - 110)  BP: 119/80 (10-15-23 @ 08:34) (111/77 - 134/66)  RR: 17 (10-15-23 @ 08:34) (16 - 22)  SpO2: 95% (10-15-23 @ 08:34) (95% - 99%)  Wt(kg): --    I&O's Summary    14 Oct 2023 07:01  -  15 Oct 2023 07:00  --------------------------------------------------------  IN: 420 mL / OUT: 500 mL / NET: -80 mL                            PHYSICAL EXAM:  HENT: NCAT, EOMI, PEERLA  CV: RRR, S1/S2. No JVD. No murmurs, rubs or gallops   PULM: CTA B/L. No wheezing, rales, or rhonchi   ABD: Soft, NT/ND, +BS throughout   EXT: Warm, no LE edema  NEURO: AOx3; no focal neuro deficits    LABS:	 	                        14.1   4.41  )-----------( 227      ( 15 Oct 2023 05:30 )             41.7     10-15    134<L>  |  97  |  17  ----------------------------<  132<H>  4.0   |  28  |  1.18    Ca    9.0      15 Oct 2023 05:30  Mg     1.5     10-15

## 2023-10-15 NOTE — PROGRESS NOTE ADULT - PROBLEM SELECTOR PLAN 5
- Hgb A1c 6.4. Monitor FS while on ISS  - Holding home Alogliptin-Metformin 12.5-1000mg BID while in-patient     - Fluids: Plan for pre-cath fluids   - Replete Lytes PRN to K>4, Mg>2  - Diet: DASH + NPO after MN   - DVT ppx: SQH   - Dispo: Medically active. Pending [facility] on 10/16

## 2023-10-15 NOTE — PROGRESS NOTE ADULT - PROBLEM SELECTOR PLAN 4
- Etiology possibly vasovagal in nature given symptomatology   - Orthostatics neg; TFTs WNL; UTox pending    - Consider EP consult for possible event monitor/loop recorder

## 2023-10-16 LAB
ALBUMIN SERPL ELPH-MCNC: 3.3 G/DL — SIGNIFICANT CHANGE UP (ref 3.3–5)
ALP SERPL-CCNC: 92 U/L — SIGNIFICANT CHANGE UP (ref 40–120)
ALT FLD-CCNC: 86 U/L — HIGH (ref 10–45)
ANION GAP SERPL CALC-SCNC: 7 MMOL/L — SIGNIFICANT CHANGE UP (ref 5–17)
APTT BLD: 34.1 SEC — SIGNIFICANT CHANGE UP (ref 24.5–35.6)
AST SERPL-CCNC: 41 U/L — HIGH (ref 10–40)
BILIRUB SERPL-MCNC: 0.3 MG/DL — SIGNIFICANT CHANGE UP (ref 0.2–1.2)
BLD GP AB SCN SERPL QL: NEGATIVE — SIGNIFICANT CHANGE UP
BUN SERPL-MCNC: 11 MG/DL — SIGNIFICANT CHANGE UP (ref 7–23)
CALCIUM SERPL-MCNC: 8.6 MG/DL — SIGNIFICANT CHANGE UP (ref 8.4–10.5)
CHLORIDE SERPL-SCNC: 100 MMOL/L — SIGNIFICANT CHANGE UP (ref 96–108)
CO2 SERPL-SCNC: 27 MMOL/L — SIGNIFICANT CHANGE UP (ref 22–31)
CREAT SERPL-MCNC: 1.06 MG/DL — SIGNIFICANT CHANGE UP (ref 0.5–1.3)
EGFR: 87 ML/MIN/1.73M2 — SIGNIFICANT CHANGE UP
GLUCOSE BLDC GLUCOMTR-MCNC: 105 MG/DL — HIGH (ref 70–99)
GLUCOSE BLDC GLUCOMTR-MCNC: 105 MG/DL — HIGH (ref 70–99)
GLUCOSE BLDC GLUCOMTR-MCNC: 106 MG/DL — HIGH (ref 70–99)
GLUCOSE BLDC GLUCOMTR-MCNC: 113 MG/DL — HIGH (ref 70–99)
GLUCOSE BLDC GLUCOMTR-MCNC: 125 MG/DL — HIGH (ref 70–99)
GLUCOSE BLDC GLUCOMTR-MCNC: 138 MG/DL — HIGH (ref 70–99)
GLUCOSE BLDC GLUCOMTR-MCNC: 138 MG/DL — HIGH (ref 70–99)
GLUCOSE SERPL-MCNC: 110 MG/DL — HIGH (ref 70–99)
HCT VFR BLD CALC: 41.4 % — SIGNIFICANT CHANGE UP (ref 39–50)
HGB BLD-MCNC: 13.5 G/DL — SIGNIFICANT CHANGE UP (ref 13–17)
INR BLD: 1.21 — HIGH (ref 0.85–1.18)
MAGNESIUM SERPL-MCNC: 1.7 MG/DL — SIGNIFICANT CHANGE UP (ref 1.6–2.6)
MCHC RBC-ENTMCNC: 29 PG — SIGNIFICANT CHANGE UP (ref 27–34)
MCHC RBC-ENTMCNC: 32.6 GM/DL — SIGNIFICANT CHANGE UP (ref 32–36)
MCV RBC AUTO: 88.8 FL — SIGNIFICANT CHANGE UP (ref 80–100)
NRBC # BLD: 0 /100 WBCS — SIGNIFICANT CHANGE UP (ref 0–0)
PLATELET # BLD AUTO: 204 K/UL — SIGNIFICANT CHANGE UP (ref 150–400)
POTASSIUM SERPL-MCNC: 4 MMOL/L — SIGNIFICANT CHANGE UP (ref 3.5–5.3)
POTASSIUM SERPL-SCNC: 4 MMOL/L — SIGNIFICANT CHANGE UP (ref 3.5–5.3)
PROT SERPL-MCNC: 7.1 G/DL — SIGNIFICANT CHANGE UP (ref 6–8.3)
PROTHROM AB SERPL-ACNC: 13.7 SEC — HIGH (ref 9.5–13)
RBC # BLD: 4.66 M/UL — SIGNIFICANT CHANGE UP (ref 4.2–5.8)
RBC # FLD: 13.6 % — SIGNIFICANT CHANGE UP (ref 10.3–14.5)
RH IG SCN BLD-IMP: POSITIVE — SIGNIFICANT CHANGE UP
SODIUM SERPL-SCNC: 134 MMOL/L — LOW (ref 135–145)
WBC # BLD: 2.63 K/UL — LOW (ref 3.8–10.5)
WBC # FLD AUTO: 2.63 K/UL — LOW (ref 3.8–10.5)

## 2023-10-16 PROCEDURE — 92928 PRQ TCAT PLMT NTRAC ST 1 LES: CPT | Mod: LC

## 2023-10-16 PROCEDURE — 93458 L HRT ARTERY/VENTRICLE ANGIO: CPT | Mod: 26,59

## 2023-10-16 PROCEDURE — 99152 MOD SED SAME PHYS/QHP 5/>YRS: CPT

## 2023-10-16 PROCEDURE — 99233 SBSQ HOSP IP/OBS HIGH 50: CPT

## 2023-10-16 RX ORDER — SODIUM CHLORIDE 9 MG/ML
1000 INJECTION INTRAMUSCULAR; INTRAVENOUS; SUBCUTANEOUS
Refills: 0 | Status: DISCONTINUED | OUTPATIENT
Start: 2023-10-16 | End: 2023-10-17

## 2023-10-16 RX ORDER — ACETAMINOPHEN 500 MG
650 TABLET ORAL ONCE
Refills: 0 | Status: COMPLETED | OUTPATIENT
Start: 2023-10-16 | End: 2023-10-16

## 2023-10-16 RX ORDER — CLOPIDOGREL BISULFATE 75 MG/1
75 TABLET, FILM COATED ORAL DAILY
Refills: 0 | Status: DISCONTINUED | OUTPATIENT
Start: 2023-10-17 | End: 2023-10-17

## 2023-10-16 RX ORDER — MAGNESIUM SULFATE 500 MG/ML
2 VIAL (ML) INJECTION ONCE
Refills: 0 | Status: COMPLETED | OUTPATIENT
Start: 2023-10-16 | End: 2023-10-16

## 2023-10-16 RX ORDER — ATORVASTATIN CALCIUM 80 MG/1
80 TABLET, FILM COATED ORAL AT BEDTIME
Refills: 0 | Status: DISCONTINUED | OUTPATIENT
Start: 2023-10-16 | End: 2023-10-17

## 2023-10-16 RX ORDER — CLOPIDOGREL BISULFATE 75 MG/1
600 TABLET, FILM COATED ORAL ONCE
Refills: 0 | Status: COMPLETED | OUTPATIENT
Start: 2023-10-16 | End: 2023-10-16

## 2023-10-16 RX ADMIN — ATORVASTATIN CALCIUM 80 MILLIGRAM(S): 80 TABLET, FILM COATED ORAL at 21:09

## 2023-10-16 RX ADMIN — Medication 25 GRAM(S): at 09:48

## 2023-10-16 RX ADMIN — Medication 81 MILLIGRAM(S): at 11:47

## 2023-10-16 RX ADMIN — SODIUM CHLORIDE 250 MILLILITER(S): 9 INJECTION INTRAMUSCULAR; INTRAVENOUS; SUBCUTANEOUS at 15:25

## 2023-10-16 RX ADMIN — Medication 650 MILLIGRAM(S): at 21:11

## 2023-10-16 RX ADMIN — HEPARIN SODIUM 5000 UNIT(S): 5000 INJECTION INTRAVENOUS; SUBCUTANEOUS at 21:09

## 2023-10-16 RX ADMIN — LISINOPRIL 30 MILLIGRAM(S): 2.5 TABLET ORAL at 07:43

## 2023-10-16 RX ADMIN — Medication 650 MILLIGRAM(S): at 19:49

## 2023-10-16 RX ADMIN — CLOPIDOGREL BISULFATE 600 MILLIGRAM(S): 75 TABLET, FILM COATED ORAL at 09:48

## 2023-10-16 NOTE — PROGRESS NOTE ADULT - PROBLEM SELECTOR PLAN 3
- , HDL 28, Total Cholesterol 152, TG 86.   - Consider increasing Atorvastatin 40 mg QHS to 80mg QHS

## 2023-10-16 NOTE — PROGRESS NOTE ADULT - SUBJECTIVE AND OBJECTIVE BOX
CARDIOLOGY PA PROGRESS NOTE    Interval Events:  - Plan for [facility] on 10/16     Overnight Events: GABBY    Subjective:  - Pt seen and examined this AM sitting comfortably in bed. Feels well; no acute complaints of CP, palpitations, SOB, abdominal discomfort, N/V/D    ROS: Negative except per HPI and Subjective     Tele: NSR with frequent [place]     MEDICATIONS:  lisinopril 30 milliGRAM(s) Oral daily  baclofen 20 milliGRAM(s) Oral every 12 hours PRN  senna 1 Tablet(s) Oral daily  atorvastatin 80 milliGRAM(s) Oral at bedtime  dextrose 50% Injectable 12.5 Gram(s) IV Push once  dextrose 50% Injectable 25 Gram(s) IV Push once  dextrose 50% Injectable 25 Gram(s) IV Push once  dextrose Oral Gel 15 Gram(s) Oral once PRN  glucagon  Injectable 1 milliGRAM(s) IntraMuscular once  insulin lispro (ADMELOG) corrective regimen sliding scale   SubCutaneous Before meals and at bedtime  aspirin enteric coated 81 milliGRAM(s) Oral daily  dextrose 5%. 1000 milliLiter(s) IV Continuous <Continuous>  dextrose 5%. 1000 milliLiter(s) IV Continuous <Continuous>  heparin   Injectable 5000 Unit(s) SubCutaneous every 8 hours  influenza   Vaccine 0.5 milliLiter(s) IntraMuscular once  sodium chloride 0.9%. 1000 milliLiter(s) IV Continuous <Continuous>    	  VITAL SIGNS:  T(C): 36 (10-16-23 @ 11:46), Max: 36.7 (10-15-23 @ 17:06)  HR: 73 (10-16-23 @ 11:46) (73 - 84)  BP: 121/77 (10-16-23 @ 11:46) (112/66 - 132/87)  RR: 16 (10-16-23 @ 11:46) (16 - 20)  SpO2: 98% (10-16-23 @ 11:46) (95% - 98%)  Wt(kg): --    I&O's Summary    15 Oct 2023 07:01  -  16 Oct 2023 07:00  --------------------------------------------------------  IN: 600 mL / OUT: 650 mL / NET: -50 mL    16 Oct 2023 07:01  -  16 Oct 2023 15:16  --------------------------------------------------------  IN: 0 mL / OUT: 475 mL / NET: -475 mL                                       PHYSICAL EXAM:  HENT: NCAT, EOMI, PEERLA  CV: RRR, S1/S2. No murmurs, rubs or gallops   PULM: CTA B/L. No wheezing, rales, or rhonchi   ABD: Soft, NT/ND, +BS throughout   EXT: Warm, no LE edema  NEURO: AOx3; no focal neuro deficits    LABS:	 	               13.5   2.63  )-----------( 204      ( 16 Oct 2023 05:30 )             41.4     10-16    134<L>  |  100  |  11  ----------------------------<  110<H>  4.0   |  27  |  1.06    Ca    8.6      16 Oct 2023 05:30  Mg     1.7     10-16    TPro  7.1  /  Alb  3.3  /  TBili  0.3  /  DBili  x   /  AST  41<H>  /  ALT  86<H>  /  AlkPhos  92  10-16      PT/INR - ( 16 Oct 2023 05:30 )   PT: 13.7 sec;   INR: 1.21          PTT - ( 16 Oct 2023 05:30 )  PTT:34.1 sec

## 2023-10-16 NOTE — PROGRESS NOTE ADULT - PROBLEM SELECTOR PLAN 1
- Continue ASA 81mg  -Troponin T Hs 25->22 (negative) . D-dimer 648. EKG non-ischemic.   - TTE (10/13/23): EF 61%, normal biventricular size and systolic function, No significant valvular disease, No evidence of pHTN, No pericardial effusion.  - CTA PE (10/13/23): neg for PE   - CCTA (10/13/23): Ca score 197 Agatston units, 98th percentile. mLCx calcific obscures the vessel lumen where stenosis severity is indeterminate; followed by an area of possible severe stenosis, Mild LAD stenosis, Remaining coronary segments appear   - Plan for LHC on 10/16/23 -- add on to schedule with Dr. Bains     o Administer Plavix 600mg load 10/16    o Pre-cath fluids administered overnight     o Consent in chart

## 2023-10-16 NOTE — PROGRESS NOTE ADULT - PROBLEM SELECTOR PLAN 5
- Hgb A1c 6.4. Monitor FS while on ISS  - Holding home Alogliptin-Metformin 12.5-1000mg BID while in-patient     - Fluids: Plan for pre-cath fluids   - Replete Lytes PRN to K>4, Mg>2  - Diet: DASH + NPO after MN   - DVT ppx: SQH   - Dispo: Medically active. Pending [facility] on 10/16

## 2023-10-17 ENCOUNTER — TRANSCRIPTION ENCOUNTER (OUTPATIENT)
Age: 48
End: 2023-10-17

## 2023-10-17 VITALS
RESPIRATION RATE: 16 BRPM | DIASTOLIC BLOOD PRESSURE: 75 MMHG | SYSTOLIC BLOOD PRESSURE: 122 MMHG | TEMPERATURE: 97 F | OXYGEN SATURATION: 100 % | HEART RATE: 74 BPM

## 2023-10-17 LAB
ANION GAP SERPL CALC-SCNC: 6 MMOL/L — SIGNIFICANT CHANGE UP (ref 5–17)
ANION GAP SERPL CALC-SCNC: 6 MMOL/L — SIGNIFICANT CHANGE UP (ref 5–17)
BUN SERPL-MCNC: 10 MG/DL — SIGNIFICANT CHANGE UP (ref 7–23)
BUN SERPL-MCNC: 10 MG/DL — SIGNIFICANT CHANGE UP (ref 7–23)
CALCIUM SERPL-MCNC: 8.5 MG/DL — SIGNIFICANT CHANGE UP (ref 8.4–10.5)
CALCIUM SERPL-MCNC: 8.5 MG/DL — SIGNIFICANT CHANGE UP (ref 8.4–10.5)
CHLORIDE SERPL-SCNC: 106 MMOL/L — SIGNIFICANT CHANGE UP (ref 96–108)
CHLORIDE SERPL-SCNC: 106 MMOL/L — SIGNIFICANT CHANGE UP (ref 96–108)
CO2 SERPL-SCNC: 25 MMOL/L — SIGNIFICANT CHANGE UP (ref 22–31)
CO2 SERPL-SCNC: 25 MMOL/L — SIGNIFICANT CHANGE UP (ref 22–31)
CREAT SERPL-MCNC: 1.01 MG/DL — SIGNIFICANT CHANGE UP (ref 0.5–1.3)
CREAT SERPL-MCNC: 1.01 MG/DL — SIGNIFICANT CHANGE UP (ref 0.5–1.3)
D DIMER BLD IA.RAPID-MCNC: 213 NG/ML DDU — SIGNIFICANT CHANGE UP
D DIMER BLD IA.RAPID-MCNC: 213 NG/ML DDU — SIGNIFICANT CHANGE UP
EGFR: 92 ML/MIN/1.73M2 — SIGNIFICANT CHANGE UP
EGFR: 92 ML/MIN/1.73M2 — SIGNIFICANT CHANGE UP
GLUCOSE BLDC GLUCOMTR-MCNC: 108 MG/DL — HIGH (ref 70–99)
GLUCOSE BLDC GLUCOMTR-MCNC: 108 MG/DL — HIGH (ref 70–99)
GLUCOSE BLDC GLUCOMTR-MCNC: 112 MG/DL — HIGH (ref 70–99)
GLUCOSE BLDC GLUCOMTR-MCNC: 112 MG/DL — HIGH (ref 70–99)
GLUCOSE BLDC GLUCOMTR-MCNC: 137 MG/DL — HIGH (ref 70–99)
GLUCOSE BLDC GLUCOMTR-MCNC: 137 MG/DL — HIGH (ref 70–99)
GLUCOSE SERPL-MCNC: 85 MG/DL — SIGNIFICANT CHANGE UP (ref 70–99)
GLUCOSE SERPL-MCNC: 85 MG/DL — SIGNIFICANT CHANGE UP (ref 70–99)
HCT VFR BLD CALC: 36.4 % — LOW (ref 39–50)
HCT VFR BLD CALC: 36.4 % — LOW (ref 39–50)
HGB BLD-MCNC: 12 G/DL — LOW (ref 13–17)
HGB BLD-MCNC: 12 G/DL — LOW (ref 13–17)
MAGNESIUM SERPL-MCNC: 1.9 MG/DL — SIGNIFICANT CHANGE UP (ref 1.6–2.6)
MAGNESIUM SERPL-MCNC: 1.9 MG/DL — SIGNIFICANT CHANGE UP (ref 1.6–2.6)
MCHC RBC-ENTMCNC: 29.1 PG — SIGNIFICANT CHANGE UP (ref 27–34)
MCHC RBC-ENTMCNC: 29.1 PG — SIGNIFICANT CHANGE UP (ref 27–34)
MCHC RBC-ENTMCNC: 33 GM/DL — SIGNIFICANT CHANGE UP (ref 32–36)
MCHC RBC-ENTMCNC: 33 GM/DL — SIGNIFICANT CHANGE UP (ref 32–36)
MCV RBC AUTO: 88.1 FL — SIGNIFICANT CHANGE UP (ref 80–100)
MCV RBC AUTO: 88.1 FL — SIGNIFICANT CHANGE UP (ref 80–100)
NRBC # BLD: 0 /100 WBCS — SIGNIFICANT CHANGE UP (ref 0–0)
NRBC # BLD: 0 /100 WBCS — SIGNIFICANT CHANGE UP (ref 0–0)
PLATELET # BLD AUTO: 188 K/UL — SIGNIFICANT CHANGE UP (ref 150–400)
PLATELET # BLD AUTO: 188 K/UL — SIGNIFICANT CHANGE UP (ref 150–400)
POTASSIUM SERPL-MCNC: 4.1 MMOL/L — SIGNIFICANT CHANGE UP (ref 3.5–5.3)
POTASSIUM SERPL-MCNC: 4.1 MMOL/L — SIGNIFICANT CHANGE UP (ref 3.5–5.3)
POTASSIUM SERPL-SCNC: 4.1 MMOL/L — SIGNIFICANT CHANGE UP (ref 3.5–5.3)
POTASSIUM SERPL-SCNC: 4.1 MMOL/L — SIGNIFICANT CHANGE UP (ref 3.5–5.3)
RBC # BLD: 4.13 M/UL — LOW (ref 4.2–5.8)
RBC # BLD: 4.13 M/UL — LOW (ref 4.2–5.8)
RBC # FLD: 13.7 % — SIGNIFICANT CHANGE UP (ref 10.3–14.5)
RBC # FLD: 13.7 % — SIGNIFICANT CHANGE UP (ref 10.3–14.5)
SODIUM SERPL-SCNC: 137 MMOL/L — SIGNIFICANT CHANGE UP (ref 135–145)
SODIUM SERPL-SCNC: 137 MMOL/L — SIGNIFICANT CHANGE UP (ref 135–145)
WBC # BLD: 3.55 K/UL — LOW (ref 3.8–10.5)
WBC # BLD: 3.55 K/UL — LOW (ref 3.8–10.5)
WBC # FLD AUTO: 3.55 K/UL — LOW (ref 3.8–10.5)
WBC # FLD AUTO: 3.55 K/UL — LOW (ref 3.8–10.5)

## 2023-10-17 PROCEDURE — 83735 ASSAY OF MAGNESIUM: CPT

## 2023-10-17 PROCEDURE — 36415 COLL VENOUS BLD VENIPUNCTURE: CPT

## 2023-10-17 PROCEDURE — 85025 COMPLETE CBC W/AUTO DIFF WBC: CPT

## 2023-10-17 PROCEDURE — 86901 BLOOD TYPING SEROLOGIC RH(D): CPT

## 2023-10-17 PROCEDURE — 85730 THROMBOPLASTIN TIME PARTIAL: CPT

## 2023-10-17 PROCEDURE — 83935 ASSAY OF URINE OSMOLALITY: CPT

## 2023-10-17 PROCEDURE — 83036 HEMOGLOBIN GLYCOSYLATED A1C: CPT

## 2023-10-17 PROCEDURE — 84300 ASSAY OF URINE SODIUM: CPT

## 2023-10-17 PROCEDURE — 85027 COMPLETE CBC AUTOMATED: CPT

## 2023-10-17 PROCEDURE — 86900 BLOOD TYPING SEROLOGIC ABO: CPT

## 2023-10-17 PROCEDURE — 84484 ASSAY OF TROPONIN QUANT: CPT

## 2023-10-17 PROCEDURE — C1894: CPT

## 2023-10-17 PROCEDURE — 93306 TTE W/DOPPLER COMPLETE: CPT

## 2023-10-17 PROCEDURE — 71275 CT ANGIOGRAPHY CHEST: CPT

## 2023-10-17 PROCEDURE — 71046 X-RAY EXAM CHEST 2 VIEWS: CPT

## 2023-10-17 PROCEDURE — 75574 CT ANGIO HRT W/3D IMAGE: CPT

## 2023-10-17 PROCEDURE — C1874: CPT

## 2023-10-17 PROCEDURE — C1887: CPT

## 2023-10-17 PROCEDURE — 82570 ASSAY OF URINE CREATININE: CPT

## 2023-10-17 PROCEDURE — 85379 FIBRIN DEGRADATION QUANT: CPT

## 2023-10-17 PROCEDURE — 93005 ELECTROCARDIOGRAM TRACING: CPT

## 2023-10-17 PROCEDURE — 82962 GLUCOSE BLOOD TEST: CPT

## 2023-10-17 PROCEDURE — 93970 EXTREMITY STUDY: CPT

## 2023-10-17 PROCEDURE — 84540 ASSAY OF URINE/UREA-N: CPT

## 2023-10-17 PROCEDURE — 82553 CREATINE MB FRACTION: CPT

## 2023-10-17 PROCEDURE — 99285 EMERGENCY DEPT VISIT HI MDM: CPT | Mod: 25

## 2023-10-17 PROCEDURE — 81003 URINALYSIS AUTO W/O SCOPE: CPT

## 2023-10-17 PROCEDURE — 93970 EXTREMITY STUDY: CPT | Mod: 26

## 2023-10-17 PROCEDURE — 85610 PROTHROMBIN TIME: CPT

## 2023-10-17 PROCEDURE — 86850 RBC ANTIBODY SCREEN: CPT

## 2023-10-17 PROCEDURE — 80061 LIPID PANEL: CPT

## 2023-10-17 PROCEDURE — 84443 ASSAY THYROID STIM HORMONE: CPT

## 2023-10-17 PROCEDURE — 82550 ASSAY OF CK (CPK): CPT

## 2023-10-17 PROCEDURE — 84156 ASSAY OF PROTEIN URINE: CPT

## 2023-10-17 PROCEDURE — 80048 BASIC METABOLIC PNL TOTAL CA: CPT

## 2023-10-17 PROCEDURE — 93971 EXTREMITY STUDY: CPT | Mod: 26,GC

## 2023-10-17 PROCEDURE — 99223 1ST HOSP IP/OBS HIGH 75: CPT

## 2023-10-17 PROCEDURE — 84133 ASSAY OF URINE POTASSIUM: CPT

## 2023-10-17 PROCEDURE — 99239 HOSP IP/OBS DSCHRG MGMT >30: CPT

## 2023-10-17 PROCEDURE — 99223 1ST HOSP IP/OBS HIGH 75: CPT | Mod: GC

## 2023-10-17 PROCEDURE — 93010 ELECTROCARDIOGRAM REPORT: CPT

## 2023-10-17 PROCEDURE — 84439 ASSAY OF FREE THYROXINE: CPT

## 2023-10-17 PROCEDURE — C1769: CPT

## 2023-10-17 PROCEDURE — C1725: CPT

## 2023-10-17 PROCEDURE — 80053 COMPREHEN METABOLIC PANEL: CPT

## 2023-10-17 RX ORDER — ASPIRIN/CALCIUM CARB/MAGNESIUM 324 MG
1 TABLET ORAL
Qty: 0 | Refills: 0 | DISCHARGE
Start: 2023-10-17

## 2023-10-17 RX ORDER — HYDROCHLOROTHIAZIDE 25 MG
1 TABLET ORAL
Refills: 0 | DISCHARGE

## 2023-10-17 RX ORDER — LISINOPRIL 2.5 MG/1
1 TABLET ORAL
Qty: 90 | Refills: 1
Start: 2023-10-17 | End: 2024-04-13

## 2023-10-17 RX ORDER — ATORVASTATIN CALCIUM 80 MG/1
1 TABLET, FILM COATED ORAL
Qty: 0 | Refills: 0 | DISCHARGE
Start: 2023-10-17

## 2023-10-17 RX ORDER — CLOPIDOGREL BISULFATE 75 MG/1
1 TABLET, FILM COATED ORAL
Qty: 0 | Refills: 0 | DISCHARGE
Start: 2023-10-17

## 2023-10-17 RX ORDER — ATORVASTATIN CALCIUM 80 MG/1
1 TABLET, FILM COATED ORAL
Refills: 0 | DISCHARGE

## 2023-10-17 RX ORDER — MAGNESIUM OXIDE 400 MG ORAL TABLET 241.3 MG
800 TABLET ORAL ONCE
Refills: 0 | Status: COMPLETED | OUTPATIENT
Start: 2023-10-17 | End: 2023-10-17

## 2023-10-17 RX ORDER — ATORVASTATIN CALCIUM 80 MG/1
1 TABLET, FILM COATED ORAL
Qty: 90 | Refills: 1
Start: 2023-10-17 | End: 2024-04-13

## 2023-10-17 RX ADMIN — MAGNESIUM OXIDE 400 MG ORAL TABLET 800 MILLIGRAM(S): 241.3 TABLET ORAL at 11:21

## 2023-10-17 RX ADMIN — HEPARIN SODIUM 5000 UNIT(S): 5000 INJECTION INTRAVENOUS; SUBCUTANEOUS at 05:40

## 2023-10-17 RX ADMIN — CLOPIDOGREL BISULFATE 75 MILLIGRAM(S): 75 TABLET, FILM COATED ORAL at 11:22

## 2023-10-17 RX ADMIN — LISINOPRIL 30 MILLIGRAM(S): 2.5 TABLET ORAL at 05:41

## 2023-10-17 RX ADMIN — Medication 81 MILLIGRAM(S): at 11:21

## 2023-10-17 RX ADMIN — HEPARIN SODIUM 5000 UNIT(S): 5000 INJECTION INTRAVENOUS; SUBCUTANEOUS at 14:42

## 2023-10-17 NOTE — CONSULT NOTE ADULT - ATTENDING COMMENTS
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  Outpatient follow up with repeat imaging is recommended.

## 2023-10-17 NOTE — DISCHARGE NOTE NURSING/CASE MANAGEMENT/SOCIAL WORK - PATIENT PORTAL LINK FT
You can access the FollowMyHealth Patient Portal offered by [facility] by registering at the following website: [email or web address]. By joining [employer]’s FollowMyHealth portal, you will also be able to view your health information using other applications (apps) compatible with our system.

## 2023-10-17 NOTE — PROGRESS NOTE ADULT - NS ATTEND AMEND GEN_ALL_CORE FT
48 with presyncope, PCI to LAD, normal EF, normal ECG and benign tele. Offered implantable loop vs external monitor. Patients decided on external monitor. Will follow up as outpatient.

## 2023-10-17 NOTE — DISCHARGE NOTE NURSING/CASE MANAGEMENT/SOCIAL WORK - NSDCPEFALRISK_GEN_ALL_CORE
For information on Fall & Injury Prevention, visit: [email or web address] OR  [email or web address] OR  https://www.cdc.gov/steadi/patient.html

## 2023-10-17 NOTE — CONSULT NOTE ADULT - SUBJECTIVE AND OBJECTIVE BOX
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 Hepatitis A, Chronic Back pain s/p Fall s/p Back Surgery 8/2019, GIB (underwent endoscopy and colonoscopy 2 months ago with no source of bleed seen, polyp removed with recommendation to come back in 1 year) who presents to [facility] ED 10/12/13 c/o intermittent episodes of chest heaviness a/w lightheadedness, palpitations, nausea, diaphoresis, SOB which are present w/ exertion and relieved with rest. Patient reports he has been experiencing these symptoms since Summer 2023 however feels they have gotten worse. He also admits to syncope x 4 episodes at home following these symptoms but denies paresthesias of upper or lower extremities, weakness/paralysis of upper or lower extremities, seizure activity, loss of cordelia or bladder incontinence, H/A prior to or after syncopal episodes. He also denies checking Fingerstick before or after syncopal episodes. Patient states he presented to [facility] ER 1 week for similar complaints and was sent home and told to follow up with PMD and cardiologist. Symptoms worsened which prompted patient to return to ER. He denies LE edema, PND, orthopnea, cough, fever, chills, diarrhea, abdominal pain. VS on arrival to ER /88  RR 20 Temp 97.9 F and O2 sat 94% RA. Troponin T Hs 25->22, .  EKG showed NSR at 89 bpm with PACs, no ST changes and TWI III. CXR (wet read) no effusions or infiltrates. Labs significant for K 3.4, Cl 94, Glucose 141. Treatment in the ER:  mg PO x 1 and Kdur 40 mEq PO x 1. Patient is now admitted to R/O ACS including serial enzymes, telemetry, echocardiogram and probable ischemic evaluation.     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 Pt asymptomatic throughout hospitalization. Denies long travel, smoking, cancer history, LE edema, family hx of DVTs. Does endorse multiple syncopal episodes throughout the past few months with lightheadedness, palpitations preceding the events.    REVIEW OF SYSTEMS:  Constitutional: No fever, weight loss or fatigue  Eyes: No eye pain, visual disturbances, or discharge  ENMT:  No difficulty hearing, tinnitus, vertigo; No sinus or throat pain  Neck: No pain, stiffness or neck swelling  Respiratory: see HPI  Cardiovascular: No chest pain, palpitations, dizziness or leg swelling  Gastrointestinal: No abdominal or epigastric pain. No nausea, vomiting or hematemesis; No diarrhea or constipation. No melena or hematochezia.  Genitourinary: No dysuria, frequency, hematuria or incontinence  Neurological: No headaches, memory loss, loss of strength, numbness or tremors  Skin: No itching, burning, rashes or lesions   Lymph Nodes: No enlarged glands  Endocrine: No heat or cold intolerance; No hair loss  Musculoskeletal: No joint pain or swelling; No muscle, back or extremity pain  Psychiatric: No depression, anxiety, mood swings or difficulty sleeping  Heme/Lymph: No easy bruising or bleeding gums  Allergy and Immunologic: No hives or eczema    PAST MEDICAL & SURGICAL HISTORY:  HTN (hypertension)      HLD (hyperlipidemia)      Diabetes mellitus, type 2      History of back surgery          FAMILY HISTORY:  FHx: heart disease (Mother)        SOCIAL HISTORY:  Smoking Status: [ ] Current, [ ] Former, [ ] Never  Pack Years:    MEDICATIONS:  Pulmonary:    Antimicrobials:    Anticoagulants:  aspirin enteric coated 81 milliGRAM(s) Oral daily  clopidogrel Tablet 75 milliGRAM(s) Oral daily  heparin   Injectable 5000 Unit(s) SubCutaneous every 8 hours    Onc:    GI/:  senna 1 Tablet(s) Oral daily    Endocrine:  atorvastatin 80 milliGRAM(s) Oral at bedtime  dextrose 50% Injectable 25 Gram(s) IV Push once  dextrose 50% Injectable 12.5 Gram(s) IV Push once  dextrose 50% Injectable 25 Gram(s) IV Push once  dextrose Oral Gel 15 Gram(s) Oral once PRN  glucagon  Injectable 1 milliGRAM(s) IntraMuscular once  insulin lispro (ADMELOG) corrective regimen sliding scale   SubCutaneous Before meals and at bedtime    Cardiac:  lisinopril 30 milliGRAM(s) Oral daily    Other Medications:  baclofen 20 milliGRAM(s) Oral every 12 hours PRN  dextrose 5%. 1000 milliLiter(s) IV Continuous <Continuous>  dextrose 5%. 1000 milliLiter(s) IV Continuous <Continuous>  influenza   Vaccine 0.5 milliLiter(s) IntraMuscular once  sodium chloride 0.9%. 1000 milliLiter(s) IV Continuous <Continuous>      Allergies    No Known Allergies    Intolerances        Vital Signs Last 24 Hrs  T(C): 36.1 (17 Oct 2023 11:20), Max: 36.6 (16 Oct 2023 20:23)  T(F): 97 (17 Oct 2023 11:20), Max: 97.9 (17 Oct 2023 05:32)  HR: 77 (17 Oct 2023 11:20) (75 - 90)  BP: 118/77 (17 Oct 2023 11:20) (112/78 - 144/88)  BP(mean): 90 (17 Oct 2023 11:20) (88 - 107)  RR: 16 (17 Oct 2023 11:20) (16 - 18)  SpO2: 99% (17 Oct 2023 11:20) (97% - 99%)    Parameters below as of 17 Oct 2023 11:20  Patient On (Oxygen Delivery Method): room air        10-16 @ 07:01  -  10-17 @ 07:00  --------------------------------------------------------  IN: 1950 mL / OUT: 1845 mL / NET: 105 mL    10-17 @ 07:01  -  10-17 @ 14:49  --------------------------------------------------------  IN: 420 mL / OUT: 850 mL / NET: -430 mL          PHYSICAL EXAM:  Constitutional: well-appearing  Head: NC/AT  EENT: PERRL, anicteric sclera; oropharynx clear, MMM  Neck: supple, no appreciable JVD  Respiratory: CTA B/L; no W/R/R  Cardiovascular: +S1/S2, RRR  Gastrointestinal: soft, NT/ND  Extremities: WWP; no edema, clubbing or cyanosis  Vascular: 2+ radial pulses B/L  Neurological: awake and alert; PANDA    LABS:      CBC Full  -  ( 17 Oct 2023 05:30 )  WBC Count : 3.55 K/uL  RBC Count : 4.13 M/uL  Hemoglobin : 12.0 g/dL  Hematocrit : 36.4 %  Platelet Count - Automated : 188 K/uL  Mean Cell Volume : 88.1 fl  Mean Cell Hemoglobin : 29.1 pg  Mean Cell Hemoglobin Concentration : 33.0 gm/dL  Auto Neutrophil # : x  Auto Lymphocyte # : x  Auto Monocyte # : x  Auto Eosinophil # : x  Auto Basophil # : x  Auto Neutrophil % : x  Auto Lymphocyte % : x  Auto Monocyte % : x  Auto Eosinophil % : x  Auto Basophil % : x    10-17    137  |  106  |  10  ----------------------------<  85  4.1   |  25  |  1.01    Ca    8.5      17 Oct 2023 05:30  Mg     1.9     10-17    TPro  7.1  /  Alb  3.3  /  TBili  0.3  /  DBili  x   /  AST  41<H>  /  ALT  86<H>  /  AlkPhos  92  10-16    PT/INR - ( 16 Oct 2023 05:30 )   PT: 13.7 sec;   INR: 1.21          PTT - ( 16 Oct 2023 05:30 )  PTT:34.1 sec      Urinalysis Basic - ( 17 Oct 2023 05:30 )    Color: x / Appearance: x / SG: x / pH: x  Gluc: 85 mg/dL / Ketone: x  / Bili: x / Urobili: x   Blood: x / Protein: x / Nitrite: x   Leuk Esterase: x / RBC: x / WBC x   Sq Epi: x / Non Sq Epi: x / Bacteria: x        RADIOLOGY & ADDITIONAL STUDIES:

## 2023-10-17 NOTE — CONSULT NOTE ADULT - ASSESSMENT
48 M former marijuana user (last used 1 month ago) FAIR HISTORIAN with FHx Heart Disease (Mother) and PMH HTN, HLD, DM Type II, ? Hepatitis A, Chronic Back pain s/p Fall s/p Back Surgery 8/2019, GIB (underwent endoscopy and colonoscopy 2 months ago with no source of bleed seen, polyp removed with recommendation to come back in 1 year) who presents to [facility] ED 10/12/13 c/o intermittent episodes of chest heaviness a/w lightheadedness, palpitations, nausea, diaphoresis, SOB. 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 nonocclusive chronic PE in [facility].     #Unprovoked low risk PE    Pt denies risk factors for PE including travel, smoking, cancer history, or family history of VTE. He is not on any known medications that put him at risk for VTE. It is unlikely that a PE of this size is responsible for his multiple syncopal episodes. There are no clear consensus guidelines on management of asymptomatic, incidental chronic PE but will manage as    INCOMPLETE 48 M former marijuana user (last used 1 month ago) FAIR HISTORIAN with FHx Heart Disease (Mother) and PMH HTN, HLD, DM Type II, ? Hepatitis A, Chronic Back pain s/p Fall s/p Back Surgery 8/2019, GIB (underwent endoscopy and colonoscopy 2 months ago with no source of bleed seen, polyp removed with recommendation to come back in 1 year) who presents to [facility] ED 10/12/13 c/o intermittent episodes of chest heaviness a/w lightheadedness, palpitations, nausea, diaphoresis, SOB. 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 nonocclusive chronic PE in [facility].     #Unprovoked low risk PE    Pt denies risk factors for PE including travel, smoking, cancer history, or family history of VTE. He is not on any known medications that put him at risk for VTE. It is unlikely that a PE of this size is responsible for his multiple syncopal episodes. There are no clear consensus guidelines on management of asymptomatic, incidental chronic PE but will manage as if it were an asymptomatic low risk acute subsegmental PE. Bedside US showed no DVTs in bilateral LE.    Recommendations:  -please order official LE dopplers  -if no DVTs are seen, pt can be discharged (once seen by pulmonary attending) without AC  -please schedule 3 month pulmonology follow up with Dr. Antonella Arciniega for repeat imaging    INCOMPLETE     48 M former marijuana user (last used 1 month ago) FAIR HISTORIAN with FHx Heart Disease (Mother) and PMH HTN, HLD, DM Type II, ? Hepatitis A, Chronic Back pain s/p Fall s/p Back Surgery 8/2019, GIB (underwent endoscopy and colonoscopy 2 months ago with no source of bleed seen, polyp removed with recommendation to come back in 1 year) who presents to [facility] ED 10/12/13 c/o intermittent episodes of chest heaviness a/w lightheadedness, palpitations, nausea, diaphoresis, SOB. 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 nonocclusive chronic PE in [facility].     #Unprovoked low risk PE    Pt denies risk factors for PE including travel, smoking, cancer history, or family history of VTE. He is not on any known medications that put him at risk for VTE. It is unlikely that a PE of this size is responsible for his multiple syncopal episodes. There are no clear consensus guidelines on management of asymptomatic, incidental chronic PE but will manage as if it were an asymptomatic low risk acute subsegmental PE. Bedside US showed no DVTs in bilateral LE. Official LE doppler also show no DVT b/l.    Recommendations:  -pt does not require AC for PE at this time  -please schedule 3 month pulmonology follow up with Dr. Antonella Arciniega for repeat imaging    Case s/e/d with Dr. 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 Hepatitis A, Chronic Back pain s/p Fall s/p Back Surgery 8/2019, GIB (underwent endoscopy and colonoscopy 2 months ago with no source of bleed seen, polyp removed with recommendation to come back in 1 year) who presents to [facility] ED 10/12/13 c/o intermittent episodes of chest heaviness a/w lightheadedness, palpitations, nausea, diaphoresis, SOB. 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 nonocclusive chronic PE in [place].     #Unprovoked low risk PE    Pt denies risk factors for PE including travel, smoking, cancer history, or family history of VTE. He is not on any known medications that put him at risk for VTE. It is unlikely that a PE of this size is responsible for his multiple syncopal episodes. There are no clear consensus guidelines on management of asymptomatic, incidental chronic PE but will manage as if it were an asymptomatic low risk acute subsegmental PE. Bedside US showed no DVTs in bilateral LE. Official LE doppler also show no DVT b/l.    Recommendations:  -pt does not require AC for these findings at this time  -please schedule 1 month pulmonology follow up with Dr. Antonella Arciniega for repeat imaging    Case s/e/d with Dr. Monsalve

## 2023-10-18 RX ORDER — CLOPIDOGREL BISULFATE 75 MG/1
1 TABLET, FILM COATED ORAL
Qty: 90 | Refills: 3
Start: 2023-10-18 | End: 2024-10-11

## 2023-10-18 RX ORDER — ASPIRIN/CALCIUM CARB/MAGNESIUM 324 MG
1 TABLET ORAL
Qty: 90 | Refills: 3
Start: 2023-10-18 | End: 2024-10-11

## 2023-12-12 ENCOUNTER — APPOINTMENT (OUTPATIENT)
Dept: PULMONOLOGY | Facility: CLINIC | Age: 48
End: 2023-12-12

## 2023-12-12 PROBLEM — Z00.00 ENCOUNTER FOR PREVENTIVE HEALTH EXAMINATION: Status: ACTIVE | Noted: 2023-12-12

## 2023-12-12 PROBLEM — E11.9 TYPE 2 DIABETES MELLITUS WITHOUT COMPLICATIONS: Chronic | Status: ACTIVE | Noted: 2023-10-13

## 2023-12-12 PROBLEM — I10 ESSENTIAL (PRIMARY) HYPERTENSION: Chronic | Status: ACTIVE | Noted: 2023-10-12

## 2023-12-12 PROBLEM — E78.5 HYPERLIPIDEMIA, UNSPECIFIED: Chronic | Status: ACTIVE | Noted: 2023-10-12

## 2024-01-03 ENCOUNTER — APPOINTMENT (OUTPATIENT)
Dept: HEART AND VASCULAR | Facility: CLINIC | Age: 49
End: 2024-01-03

## 2024-07-18 ENCOUNTER — RX RENEWAL (OUTPATIENT)
Age: 49
End: 2024-07-18

## 2024-07-18 DIAGNOSIS — I25.118 ATHEROSCLEROTIC HEART DISEASE OF NATIVE CORONARY ARTERY WITH OTHER FORMS OF ANGINA PECTORIS: ICD-10-CM

## 2024-07-18 RX ORDER — ASPIRIN 81 MG/1
81 TABLET, COATED ORAL
Qty: 90 | Refills: 0 | Status: ACTIVE | COMMUNITY
Start: 2024-07-18 | End: 1900-01-01

## 2024-07-18 RX ORDER — CLOPIDOGREL BISULFATE 75 MG/1
75 TABLET, FILM COATED ORAL
Qty: 90 | Refills: 0 | Status: ACTIVE | COMMUNITY
Start: 2024-07-18 | End: 1900-01-01